# Patient Record
Sex: MALE | Race: BLACK OR AFRICAN AMERICAN | URBAN - METROPOLITAN AREA
[De-identification: names, ages, dates, MRNs, and addresses within clinical notes are randomized per-mention and may not be internally consistent; named-entity substitution may affect disease eponyms.]

---

## 2020-07-01 ENCOUNTER — HOSPITAL ENCOUNTER (INPATIENT)
Facility: MEDICAL CENTER | Age: 60
LOS: 2 days | DRG: 101 | End: 2020-07-04
Attending: EMERGENCY MEDICINE | Admitting: INTERNAL MEDICINE
Payer: COMMERCIAL

## 2020-07-01 ENCOUNTER — APPOINTMENT (OUTPATIENT)
Dept: RADIOLOGY | Facility: MEDICAL CENTER | Age: 60
DRG: 101 | End: 2020-07-01
Attending: EMERGENCY MEDICINE
Payer: COMMERCIAL

## 2020-07-01 DIAGNOSIS — R56.9 SEIZURE (HCC): Primary | ICD-10-CM

## 2020-07-01 DIAGNOSIS — R56.9 WITNESSED SEIZURE (HCC): ICD-10-CM

## 2020-07-01 DIAGNOSIS — G93.40 ACUTE ENCEPHALOPATHY: ICD-10-CM

## 2020-07-01 DIAGNOSIS — R41.82 ALTERED MENTAL STATUS, UNSPECIFIED ALTERED MENTAL STATUS TYPE: ICD-10-CM

## 2020-07-01 DIAGNOSIS — R56.9 SEIZURE (HCC): ICD-10-CM

## 2020-07-01 LAB
ALBUMIN SERPL BCP-MCNC: 4.1 G/DL (ref 3.2–4.9)
ALBUMIN/GLOB SERPL: 1.5 G/DL
ALP SERPL-CCNC: 48 U/L (ref 30–99)
ALT SERPL-CCNC: 12 U/L (ref 2–50)
AMPHET UR QL SCN: NEGATIVE
ANION GAP SERPL CALC-SCNC: 14 MMOL/L (ref 7–16)
APPEARANCE UR: CLEAR
AST SERPL-CCNC: 12 U/L (ref 12–45)
BARBITURATES UR QL SCN: NEGATIVE
BASOPHILS # BLD AUTO: 0.2 % (ref 0–1.8)
BASOPHILS # BLD: 0.01 K/UL (ref 0–0.12)
BENZODIAZ UR QL SCN: NEGATIVE
BILIRUB SERPL-MCNC: 0.3 MG/DL (ref 0.1–1.5)
BILIRUB UR QL STRIP.AUTO: NEGATIVE
BUN SERPL-MCNC: 10 MG/DL (ref 8–22)
BZE UR QL SCN: NEGATIVE
CALCIUM SERPL-MCNC: 9.5 MG/DL (ref 8.5–10.5)
CANNABINOIDS UR QL SCN: NEGATIVE
CHLORIDE SERPL-SCNC: 101 MMOL/L (ref 96–112)
CO2 SERPL-SCNC: 25 MMOL/L (ref 20–33)
COLOR UR: YELLOW
CREAT SERPL-MCNC: 1.04 MG/DL (ref 0.5–1.4)
EOSINOPHIL # BLD AUTO: 0.04 K/UL (ref 0–0.51)
EOSINOPHIL NFR BLD: 1 % (ref 0–6.9)
ERYTHROCYTE [DISTWIDTH] IN BLOOD BY AUTOMATED COUNT: 46.5 FL (ref 35.9–50)
ETHANOL BLD-MCNC: <10.1 MG/DL (ref 0–10.1)
GLOBULIN SER CALC-MCNC: 2.7 G/DL (ref 1.9–3.5)
GLUCOSE SERPL-MCNC: 90 MG/DL (ref 65–99)
GLUCOSE UR STRIP.AUTO-MCNC: NEGATIVE MG/DL
HCT VFR BLD AUTO: 45.9 % (ref 42–52)
HGB BLD-MCNC: 14.7 G/DL (ref 14–18)
IMM GRANULOCYTES # BLD AUTO: 0.01 K/UL (ref 0–0.11)
IMM GRANULOCYTES NFR BLD AUTO: 0.2 % (ref 0–0.9)
KETONES UR STRIP.AUTO-MCNC: NEGATIVE MG/DL
LEUKOCYTE ESTERASE UR QL STRIP.AUTO: NEGATIVE
LYMPHOCYTES # BLD AUTO: 0.98 K/UL (ref 1–4.8)
LYMPHOCYTES NFR BLD: 24.3 % (ref 22–41)
MCH RBC QN AUTO: 29.5 PG (ref 27–33)
MCHC RBC AUTO-ENTMCNC: 32 G/DL (ref 33.7–35.3)
MCV RBC AUTO: 92 FL (ref 81.4–97.8)
METHADONE UR QL SCN: NEGATIVE
MICRO URNS: NORMAL
MONOCYTES # BLD AUTO: 0.39 K/UL (ref 0–0.85)
MONOCYTES NFR BLD AUTO: 9.7 % (ref 0–13.4)
NEUTROPHILS # BLD AUTO: 2.61 K/UL (ref 1.82–7.42)
NEUTROPHILS NFR BLD: 64.6 % (ref 44–72)
NITRITE UR QL STRIP.AUTO: NEGATIVE
NRBC # BLD AUTO: 0 K/UL
NRBC BLD-RTO: 0 /100 WBC
OPIATES UR QL SCN: NEGATIVE
OXYCODONE UR QL SCN: NEGATIVE
PCP UR QL SCN: NEGATIVE
PH UR STRIP.AUTO: 7 [PH] (ref 5–8)
PLATELET # BLD AUTO: 179 K/UL (ref 164–446)
PMV BLD AUTO: 10.8 FL (ref 9–12.9)
POTASSIUM SERPL-SCNC: 3.4 MMOL/L (ref 3.6–5.5)
PROPOXYPH UR QL SCN: NEGATIVE
PROT SERPL-MCNC: 6.8 G/DL (ref 6–8.2)
PROT UR QL STRIP: NEGATIVE MG/DL
RBC # BLD AUTO: 4.99 M/UL (ref 4.7–6.1)
RBC UR QL AUTO: NEGATIVE
SODIUM SERPL-SCNC: 140 MMOL/L (ref 135–145)
SP GR UR STRIP.AUTO: 1.01
TROPONIN T SERPL-MCNC: 18 NG/L (ref 6–19)
UROBILINOGEN UR STRIP.AUTO-MCNC: 0.2 MG/DL
VALPROATE SERPL-MCNC: 93.7 UG/ML (ref 50–100)
WBC # BLD AUTO: 4 K/UL (ref 4.8–10.8)

## 2020-07-01 PROCEDURE — 80053 COMPREHEN METABOLIC PANEL: CPT

## 2020-07-01 PROCEDURE — 99205 OFFICE O/P NEW HI 60 MIN: CPT | Performed by: PSYCHIATRY & NEUROLOGY

## 2020-07-01 PROCEDURE — 96375 TX/PRO/DX INJ NEW DRUG ADDON: CPT

## 2020-07-01 PROCEDURE — 70450 CT HEAD/BRAIN W/O DYE: CPT

## 2020-07-01 PROCEDURE — 700111 HCHG RX REV CODE 636 W/ 250 OVERRIDE (IP)

## 2020-07-01 PROCEDURE — C9803 HOPD COVID-19 SPEC COLLECT: HCPCS | Performed by: PSYCHIATRY & NEUROLOGY

## 2020-07-01 PROCEDURE — 80307 DRUG TEST PRSMV CHEM ANLYZR: CPT

## 2020-07-01 PROCEDURE — 80164 ASSAY DIPROPYLACETIC ACD TOT: CPT

## 2020-07-01 PROCEDURE — 700111 HCHG RX REV CODE 636 W/ 250 OVERRIDE (IP): Performed by: EMERGENCY MEDICINE

## 2020-07-01 PROCEDURE — 81003 URINALYSIS AUTO W/O SCOPE: CPT

## 2020-07-01 PROCEDURE — G0378 HOSPITAL OBSERVATION PER HR: HCPCS

## 2020-07-01 PROCEDURE — 96365 THER/PROPH/DIAG IV INF INIT: CPT

## 2020-07-01 PROCEDURE — 700105 HCHG RX REV CODE 258: Performed by: STUDENT IN AN ORGANIZED HEALTH CARE EDUCATION/TRAINING PROGRAM

## 2020-07-01 PROCEDURE — 85025 COMPLETE CBC W/AUTO DIFF WBC: CPT

## 2020-07-01 PROCEDURE — 700105 HCHG RX REV CODE 258: Performed by: EMERGENCY MEDICINE

## 2020-07-01 PROCEDURE — 84484 ASSAY OF TROPONIN QUANT: CPT

## 2020-07-01 PROCEDURE — 93005 ELECTROCARDIOGRAM TRACING: CPT | Performed by: STUDENT IN AN ORGANIZED HEALTH CARE EDUCATION/TRAINING PROGRAM

## 2020-07-01 PROCEDURE — 99285 EMERGENCY DEPT VISIT HI MDM: CPT

## 2020-07-01 PROCEDURE — 93010 ELECTROCARDIOGRAM REPORT: CPT | Performed by: INTERNAL MEDICINE

## 2020-07-01 RX ORDER — DIVALPROEX SODIUM 500 MG/1
500 TABLET, DELAYED RELEASE ORAL EVERY 12 HOURS
Status: DISCONTINUED | OUTPATIENT
Start: 2020-07-01 | End: 2020-07-02

## 2020-07-01 RX ORDER — LORAZEPAM 2 MG/ML
1 INJECTION INTRAMUSCULAR EVERY 4 HOURS PRN
Status: DISCONTINUED | OUTPATIENT
Start: 2020-07-01 | End: 2020-07-02

## 2020-07-01 RX ORDER — SODIUM CHLORIDE 9 MG/ML
INJECTION, SOLUTION INTRAVENOUS CONTINUOUS
Status: DISCONTINUED | OUTPATIENT
Start: 2020-07-01 | End: 2020-07-04

## 2020-07-01 RX ORDER — POLYETHYLENE GLYCOL 3350 17 G/17G
1 POWDER, FOR SOLUTION ORAL
Status: DISCONTINUED | OUTPATIENT
Start: 2020-07-01 | End: 2020-07-04 | Stop reason: HOSPADM

## 2020-07-01 RX ORDER — LORAZEPAM 2 MG/ML
INJECTION INTRAMUSCULAR
Status: COMPLETED
Start: 2020-07-01 | End: 2020-07-01

## 2020-07-01 RX ORDER — LORAZEPAM 2 MG/ML
1 INJECTION INTRAMUSCULAR ONCE
Status: COMPLETED | OUTPATIENT
Start: 2020-07-01 | End: 2020-07-01

## 2020-07-01 RX ORDER — DIVALPROEX SODIUM 250 MG/1
250 TABLET, DELAYED RELEASE ORAL EVERY 8 HOURS
Status: DISCONTINUED | OUTPATIENT
Start: 2020-07-02 | End: 2020-07-01

## 2020-07-01 RX ORDER — AMOXICILLIN 250 MG
2 CAPSULE ORAL 2 TIMES DAILY
Status: DISCONTINUED | OUTPATIENT
Start: 2020-07-01 | End: 2020-07-04 | Stop reason: HOSPADM

## 2020-07-01 RX ORDER — BISACODYL 10 MG
10 SUPPOSITORY, RECTAL RECTAL
Status: DISCONTINUED | OUTPATIENT
Start: 2020-07-01 | End: 2020-07-04 | Stop reason: HOSPADM

## 2020-07-01 RX ADMIN — LORAZEPAM 1 MG: 2 INJECTION INTRAMUSCULAR at 17:12

## 2020-07-01 RX ADMIN — SODIUM CHLORIDE: 9 INJECTION, SOLUTION INTRAVENOUS at 23:02

## 2020-07-01 RX ADMIN — VALPROATE SODIUM 500 MG: 100 INJECTION, SOLUTION INTRAVENOUS at 17:38

## 2020-07-01 RX ADMIN — LORAZEPAM 1 MG: 2 INJECTION INTRAMUSCULAR; INTRAVENOUS at 17:12

## 2020-07-01 ASSESSMENT — ENCOUNTER SYMPTOMS
WEAKNESS: 1
FALLS: 0
DIZZINESS: 1
HEADACHES: 0
SHORTNESS OF BREATH: 0
COUGH: 0
NAUSEA: 0
ABDOMINAL PAIN: 0
VOMITING: 0
PALPITATIONS: 0
BACK PAIN: 0
SEIZURES: 1
TINGLING: 0
NECK PAIN: 0
HEMOPTYSIS: 0

## 2020-07-01 ASSESSMENT — FIBROSIS 4 INDEX: FIB4 SCORE: 1.18

## 2020-07-02 ENCOUNTER — APPOINTMENT (OUTPATIENT)
Dept: RADIOLOGY | Facility: MEDICAL CENTER | Age: 60
DRG: 101 | End: 2020-07-02
Attending: PSYCHIATRY & NEUROLOGY
Payer: COMMERCIAL

## 2020-07-02 ENCOUNTER — APPOINTMENT (OUTPATIENT)
Dept: RADIOLOGY | Facility: MEDICAL CENTER | Age: 60
DRG: 101 | End: 2020-07-02
Attending: STUDENT IN AN ORGANIZED HEALTH CARE EDUCATION/TRAINING PROGRAM
Payer: COMMERCIAL

## 2020-07-02 PROBLEM — R07.9 CHEST PAIN: Status: ACTIVE | Noted: 2020-07-02

## 2020-07-02 PROBLEM — R56.9 WITNESSED SEIZURE (HCC): Status: ACTIVE | Noted: 2020-07-02
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
## 2020-07-02 PROBLEM — G93.40 ACUTE ENCEPHALOPATHY: Status: ACTIVE | Noted: 2020-07-02

## 2020-07-02 PROBLEM — R35.0 URINARY FREQUENCY: Status: ACTIVE | Noted: 2020-07-02

## 2020-07-02 LAB
ALBUMIN SERPL BCP-MCNC: 3.8 G/DL (ref 3.2–4.9)
ALBUMIN/GLOB SERPL: 1.6 G/DL
ALP SERPL-CCNC: 46 U/L (ref 30–99)
ALT SERPL-CCNC: 16 U/L (ref 2–50)
AMMONIA PLAS-SCNC: 76 UMOL/L (ref 11–45)
ANION GAP SERPL CALC-SCNC: 12 MMOL/L (ref 7–16)
AST SERPL-CCNC: 15 U/L (ref 12–45)
BASOPHILS # BLD AUTO: 0.4 % (ref 0–1.8)
BASOPHILS # BLD: 0.02 K/UL (ref 0–0.12)
BILIRUB SERPL-MCNC: 0.6 MG/DL (ref 0.1–1.5)
BUN SERPL-MCNC: 9 MG/DL (ref 8–22)
CALCIUM SERPL-MCNC: 9.1 MG/DL (ref 8.5–10.5)
CHLORIDE SERPL-SCNC: 103 MMOL/L (ref 96–112)
CHOLEST SERPL-MCNC: 159 MG/DL (ref 100–199)
CO2 SERPL-SCNC: 27 MMOL/L (ref 20–33)
COVID ORDER STATUS COVID19: NORMAL
CREAT SERPL-MCNC: 1.04 MG/DL (ref 0.5–1.4)
EKG IMPRESSION: NORMAL
EOSINOPHIL # BLD AUTO: 0.04 K/UL (ref 0–0.51)
EOSINOPHIL NFR BLD: 0.8 % (ref 0–6.9)
ERYTHROCYTE [DISTWIDTH] IN BLOOD BY AUTOMATED COUNT: 45.3 FL (ref 35.9–50)
EST. AVERAGE GLUCOSE BLD GHB EST-MCNC: 111 MG/DL
GLOBULIN SER CALC-MCNC: 2.4 G/DL (ref 1.9–3.5)
GLUCOSE SERPL-MCNC: 82 MG/DL (ref 65–99)
HBA1C MFR BLD: 5.5 % (ref 0–5.6)
HCT VFR BLD AUTO: 43 % (ref 42–52)
HDLC SERPL-MCNC: 53 MG/DL
HGB BLD-MCNC: 13.7 G/DL (ref 14–18)
IMM GRANULOCYTES # BLD AUTO: 0.02 K/UL (ref 0–0.11)
IMM GRANULOCYTES NFR BLD AUTO: 0.4 % (ref 0–0.9)
LDLC SERPL CALC-MCNC: 93 MG/DL
LYMPHOCYTES # BLD AUTO: 1.44 K/UL (ref 1–4.8)
LYMPHOCYTES NFR BLD: 29.9 % (ref 22–41)
MAGNESIUM SERPL-MCNC: 2 MG/DL (ref 1.5–2.5)
MCH RBC QN AUTO: 28.9 PG (ref 27–33)
MCHC RBC AUTO-ENTMCNC: 31.9 G/DL (ref 33.7–35.3)
MCV RBC AUTO: 90.7 FL (ref 81.4–97.8)
MONOCYTES # BLD AUTO: 0.41 K/UL (ref 0–0.85)
MONOCYTES NFR BLD AUTO: 8.5 % (ref 0–13.4)
NEUTROPHILS # BLD AUTO: 2.88 K/UL (ref 1.82–7.42)
NEUTROPHILS NFR BLD: 60 % (ref 44–72)
NRBC # BLD AUTO: 0 K/UL
NRBC BLD-RTO: 0 /100 WBC
PLATELET # BLD AUTO: 173 K/UL (ref 164–446)
PMV BLD AUTO: 10.5 FL (ref 9–12.9)
POTASSIUM SERPL-SCNC: 3.7 MMOL/L (ref 3.6–5.5)
PROT SERPL-MCNC: 6.2 G/DL (ref 6–8.2)
RBC # BLD AUTO: 4.74 M/UL (ref 4.7–6.1)
SARS-COV-2 RDRP RESP QL NAA+PROBE: NOTDETECTED
SODIUM SERPL-SCNC: 142 MMOL/L (ref 135–145)
SPECIMEN SOURCE: NORMAL
TREPONEMA PALLIDUM IGG+IGM AB [PRESENCE] IN SERUM OR PLASMA BY IMMUNOASSAY: NORMAL
TRIGL SERPL-MCNC: 64 MG/DL (ref 0–149)
TROPONIN T SERPL-MCNC: 22 NG/L (ref 6–19)
TSH SERPL DL<=0.005 MIU/L-ACNC: 0.71 UIU/ML (ref 0.38–5.33)
VIT B12 SERPL-MCNC: 958 PG/ML (ref 211–911)
WBC # BLD AUTO: 4.8 K/UL (ref 4.8–10.8)

## 2020-07-02 PROCEDURE — 700102 HCHG RX REV CODE 250 W/ 637 OVERRIDE(OP): Performed by: STUDENT IN AN ORGANIZED HEALTH CARE EDUCATION/TRAINING PROGRAM

## 2020-07-02 PROCEDURE — 96367 TX/PROPH/DG ADDL SEQ IV INF: CPT

## 2020-07-02 PROCEDURE — 82140 ASSAY OF AMMONIA: CPT

## 2020-07-02 PROCEDURE — 80053 COMPREHEN METABOLIC PANEL: CPT

## 2020-07-02 PROCEDURE — 96366 THER/PROPH/DIAG IV INF ADDON: CPT

## 2020-07-02 PROCEDURE — U0004 COV-19 TEST NON-CDC HGH THRU: HCPCS

## 2020-07-02 PROCEDURE — 70553 MRI BRAIN STEM W/O & W/DYE: CPT

## 2020-07-02 PROCEDURE — 700111 HCHG RX REV CODE 636 W/ 250 OVERRIDE (IP): Performed by: STUDENT IN AN ORGANIZED HEALTH CARE EDUCATION/TRAINING PROGRAM

## 2020-07-02 PROCEDURE — 80061 LIPID PANEL: CPT

## 2020-07-02 PROCEDURE — 700117 HCHG RX CONTRAST REV CODE 255: Performed by: PSYCHIATRY & NEUROLOGY

## 2020-07-02 PROCEDURE — 86780 TREPONEMA PALLIDUM: CPT

## 2020-07-02 PROCEDURE — A9576 INJ PROHANCE MULTIPACK: HCPCS | Performed by: PSYCHIATRY & NEUROLOGY

## 2020-07-02 PROCEDURE — 83735 ASSAY OF MAGNESIUM: CPT

## 2020-07-02 PROCEDURE — A9270 NON-COVERED ITEM OR SERVICE: HCPCS | Performed by: STUDENT IN AN ORGANIZED HEALTH CARE EDUCATION/TRAINING PROGRAM

## 2020-07-02 PROCEDURE — 700102 HCHG RX REV CODE 250 W/ 637 OVERRIDE(OP): Performed by: PSYCHIATRY & NEUROLOGY

## 2020-07-02 PROCEDURE — 97166 OT EVAL MOD COMPLEX 45 MIN: CPT

## 2020-07-02 PROCEDURE — 71045 X-RAY EXAM CHEST 1 VIEW: CPT

## 2020-07-02 PROCEDURE — 95819 EEG AWAKE AND ASLEEP: CPT | Mod: 26 | Performed by: PSYCHIATRY & NEUROLOGY

## 2020-07-02 PROCEDURE — 770020 HCHG ROOM/CARE - TELE (206)

## 2020-07-02 PROCEDURE — 700105 HCHG RX REV CODE 258: Performed by: STUDENT IN AN ORGANIZED HEALTH CARE EDUCATION/TRAINING PROGRAM

## 2020-07-02 PROCEDURE — 84484 ASSAY OF TROPONIN QUANT: CPT

## 2020-07-02 PROCEDURE — 84443 ASSAY THYROID STIM HORMONE: CPT

## 2020-07-02 PROCEDURE — 92610 EVALUATE SWALLOWING FUNCTION: CPT

## 2020-07-02 PROCEDURE — 95819 EEG AWAKE AND ASLEEP: CPT | Performed by: PSYCHIATRY & NEUROLOGY

## 2020-07-02 PROCEDURE — 96376 TX/PRO/DX INJ SAME DRUG ADON: CPT

## 2020-07-02 PROCEDURE — 83036 HEMOGLOBIN GLYCOSYLATED A1C: CPT

## 2020-07-02 PROCEDURE — 700105 HCHG RX REV CODE 258: Performed by: PSYCHIATRY & NEUROLOGY

## 2020-07-02 PROCEDURE — 700111 HCHG RX REV CODE 636 W/ 250 OVERRIDE (IP): Performed by: PSYCHIATRY & NEUROLOGY

## 2020-07-02 PROCEDURE — 84425 ASSAY OF VITAMIN B-1: CPT

## 2020-07-02 PROCEDURE — 99233 SBSQ HOSP IP/OBS HIGH 50: CPT | Mod: GC,25 | Performed by: PSYCHIATRY & NEUROLOGY

## 2020-07-02 PROCEDURE — C9254 INJECTION, LACOSAMIDE: HCPCS | Performed by: PSYCHIATRY & NEUROLOGY

## 2020-07-02 PROCEDURE — 96372 THER/PROPH/DIAG INJ SC/IM: CPT

## 2020-07-02 PROCEDURE — 4A10X4Z MONITORING OF CENTRAL NERVOUS ELECTRICAL ACTIVITY, EXTERNAL APPROACH: ICD-10-PCS | Performed by: PSYCHIATRY & NEUROLOGY

## 2020-07-02 PROCEDURE — 82607 VITAMIN B-12: CPT

## 2020-07-02 PROCEDURE — 85025 COMPLETE CBC W/AUTO DIFF WBC: CPT

## 2020-07-02 PROCEDURE — A9270 NON-COVERED ITEM OR SERVICE: HCPCS | Performed by: PSYCHIATRY & NEUROLOGY

## 2020-07-02 PROCEDURE — 36415 COLL VENOUS BLD VENIPUNCTURE: CPT

## 2020-07-02 PROCEDURE — 99221 1ST HOSP IP/OBS SF/LOW 40: CPT | Mod: GC | Performed by: INTERNAL MEDICINE

## 2020-07-02 PROCEDURE — 700101 HCHG RX REV CODE 250: Performed by: STUDENT IN AN ORGANIZED HEALTH CARE EDUCATION/TRAINING PROGRAM

## 2020-07-02 PROCEDURE — 97161 PT EVAL LOW COMPLEX 20 MIN: CPT

## 2020-07-02 RX ORDER — LORAZEPAM 2 MG/ML
1-2 INJECTION INTRAMUSCULAR EVERY 4 HOURS PRN
Status: DISCONTINUED | OUTPATIENT
Start: 2020-07-02 | End: 2020-07-04 | Stop reason: HOSPADM

## 2020-07-02 RX ORDER — LIDOCAINE AND PRILOCAINE 25; 25 MG/G; MG/G
CREAM TOPICAL PRN
Status: DISCONTINUED | OUTPATIENT
Start: 2020-07-02 | End: 2020-07-04 | Stop reason: HOSPADM

## 2020-07-02 RX ORDER — HYDROXYZINE HYDROCHLORIDE 25 MG/1
50 TABLET, FILM COATED ORAL
Status: DISCONTINUED | OUTPATIENT
Start: 2020-07-02 | End: 2020-07-02

## 2020-07-02 RX ORDER — LORAZEPAM 2 MG/ML
1 INJECTION INTRAMUSCULAR ONCE
Status: DISPENSED | OUTPATIENT
Start: 2020-07-02 | End: 2020-07-03

## 2020-07-02 RX ORDER — POTASSIUM CHLORIDE 20 MEQ/1
40 TABLET, EXTENDED RELEASE ORAL ONCE
Status: COMPLETED | OUTPATIENT
Start: 2020-07-02 | End: 2020-07-02

## 2020-07-02 RX ORDER — LORAZEPAM 2 MG/ML
1 INJECTION INTRAMUSCULAR ONCE
Status: COMPLETED | OUTPATIENT
Start: 2020-07-02 | End: 2020-07-02

## 2020-07-02 RX ADMIN — DIVALPROEX SODIUM 500 MG: 500 TABLET, DELAYED RELEASE ORAL at 00:58

## 2020-07-02 RX ADMIN — LORAZEPAM 1 MG: 2 INJECTION INTRAMUSCULAR; INTRAVENOUS at 18:51

## 2020-07-02 RX ADMIN — POTASSIUM CHLORIDE 40 MEQ: 1500 TABLET, EXTENDED RELEASE ORAL at 05:20

## 2020-07-02 RX ADMIN — SODIUM CHLORIDE 100 MG: 9 INJECTION, SOLUTION INTRAVENOUS at 00:58

## 2020-07-02 RX ADMIN — VALPROATE SODIUM 500 MG: 100 INJECTION, SOLUTION INTRAVENOUS at 13:58

## 2020-07-02 RX ADMIN — LIDOCAINE AND PRILOCAINE 1 APPLICATION: 25; 25 CREAM TOPICAL at 22:32

## 2020-07-02 RX ADMIN — DOCUSATE SODIUM 50 MG AND SENNOSIDES 8.6 MG 2 TABLET: 8.6; 5 TABLET, FILM COATED ORAL at 05:20

## 2020-07-02 RX ADMIN — ENOXAPARIN SODIUM 40 MG: 40 INJECTION, SOLUTION INTRAVENOUS; SUBCUTANEOUS at 05:20

## 2020-07-02 RX ADMIN — GADOTERIDOL 20 ML: 279.3 INJECTION, SOLUTION INTRAVENOUS at 19:10

## 2020-07-02 RX ADMIN — SODIUM CHLORIDE 100 MG: 9 INJECTION, SOLUTION INTRAVENOUS at 12:42

## 2020-07-02 ASSESSMENT — ENCOUNTER SYMPTOMS
BLURRED VISION: 0
CHILLS: 0
DIZZINESS: 1
MEMORY LOSS: 1
HEADACHES: 1
ABDOMINAL PAIN: 0
DEPRESSION: 0
FEVER: 0
FALLS: 1
NERVOUS/ANXIOUS: 1
COUGH: 1
DOUBLE VISION: 0

## 2020-07-02 ASSESSMENT — COGNITIVE AND FUNCTIONAL STATUS - GENERAL
PERSONAL GROOMING: A LITTLE
EATING MEALS: A LITTLE
DRESSING REGULAR UPPER BODY CLOTHING: A LITTLE
SUGGESTED CMS G CODE MODIFIER DAILY ACTIVITY: CK
DRESSING REGULAR UPPER BODY CLOTHING: A LITTLE
MOVING FROM LYING ON BACK TO SITTING ON SIDE OF FLAT BED: A LOT
WALKING IN HOSPITAL ROOM: A LOT
SUGGESTED CMS G CODE MODIFIER DAILY ACTIVITY: CK
MOBILITY SCORE: 24
DAILY ACTIVITIY SCORE: 15
EATING MEALS: A LITTLE
PERSONAL GROOMING: A LITTLE
MOVING TO AND FROM BED TO CHAIR: A LOT
HELP NEEDED FOR BATHING: A LOT
TOILETING: A LITTLE
TURNING FROM BACK TO SIDE WHILE IN FLAT BAD: A LOT
MOBILITY SCORE: 11
CLIMB 3 TO 5 STEPS WITH RAILING: TOTAL
SUGGESTED CMS G CODE MODIFIER MOBILITY: CL
HELP NEEDED FOR BATHING: A LITTLE
SUGGESTED CMS G CODE MODIFIER MOBILITY: CH
TOILETING: A LOT
DRESSING REGULAR LOWER BODY CLOTHING: A LOT
DAILY ACTIVITIY SCORE: 18
DRESSING REGULAR LOWER BODY CLOTHING: A LITTLE
STANDING UP FROM CHAIR USING ARMS: A LOT

## 2020-07-02 ASSESSMENT — LIFESTYLE VARIABLES
HOW MANY TIMES IN THE PAST YEAR HAVE YOU HAD 5 OR MORE DRINKS IN A DAY: 0
EVER FELT BAD OR GUILTY ABOUT YOUR DRINKING: NO
ALCOHOL_USE: NO
TOTAL SCORE: 0
ON A TYPICAL DAY WHEN YOU DRINK ALCOHOL HOW MANY DRINKS DO YOU HAVE: 0
HAVE YOU EVER FELT YOU SHOULD CUT DOWN ON YOUR DRINKING: NO
CONSUMPTION TOTAL: NEGATIVE
EVER HAD A DRINK FIRST THING IN THE MORNING TO STEADY YOUR NERVES TO GET RID OF A HANGOVER: NO
TOTAL SCORE: 0
HAVE PEOPLE ANNOYED YOU BY CRITICIZING YOUR DRINKING: NO
AVERAGE NUMBER OF DAYS PER WEEK YOU HAVE A DRINK CONTAINING ALCOHOL: 0
EVER_SMOKED: NEVER
TOTAL SCORE: 0

## 2020-07-02 ASSESSMENT — FIBROSIS 4 INDEX: FIB4 SCORE: 1.18

## 2020-07-02 ASSESSMENT — PATIENT HEALTH QUESTIONNAIRE - PHQ9
SUM OF ALL RESPONSES TO PHQ9 QUESTIONS 1 AND 2: 0
2. FEELING DOWN, DEPRESSED, IRRITABLE, OR HOPELESS: NOT AT ALL
1. LITTLE INTEREST OR PLEASURE IN DOING THINGS: NOT AT ALL

## 2020-07-02 ASSESSMENT — ACTIVITIES OF DAILY LIVING (ADL): TOILETING: INDEPENDENT

## 2020-07-02 ASSESSMENT — GAIT ASSESSMENTS: GAIT LEVEL OF ASSIST: UNABLE TO PARTICIPATE

## 2020-07-02 NOTE — THERAPY
Speech Language Pathology   Clinical Swallow Evaluation     Patient Name: Wallace Crow  AGE:  60 y.o., SEX:  male  Medical Record #: [id number]  Today's Date: 7/2/2020     Assessment    Patient is 60 y.o. male with a diagnosis of seizure with AMS. Patient was sleeping prior to session but woke with verbal cues and time. Patient was able to follow oral motor directives with no gross asymmetry or weakness appreciated. The patient was given PO trials of ice chips, thins, purees, and MTL. Patient presented with consistent throat clear and cough following >90% of PO trials. Patient was able to cough and clear vocal quality however, once additional PO trials were provided s/s concerning for penetration/aspiration persisted. Patient is presently exhibiting s/s that remain concerning for penetration/aspiration despite texture modifications and swallow strategies.     Plan    Recommendations: 1) Strict NPO with consideration of non-oral nutrition source vs reassessment by SLP in AM (7/3).     Recommend Speech Therapy 3 times per week until therapy goals are met for the following treatments:  Dysphagia Training.    Discharge recommendations:  Recommend inpatient transitional care services for continued speech therapy services.       Objective       07/02/20 0801   Oral Motor Eval    Is Patient Able to Complete Oral Motor Eval Yes, Within Normal Limits   Laryngeal Function   Voice Quality Within Functional Limits   Volutional Cough Within Functional Limits   Oral Food Presentation   Ice Chips Within Functional Limits   Single Swallow Mildly Thick (2) - (Nectar Thick)  Moderate   Serial Swallow Mildly Thick (2) - (Nectar Thick) Moderate   Single Swallow Thin (0) Moderate   Serial Swallow Thin (0) Moderate   Liquidised (3) Moderate   Pureed (4) Moderate   Dysphagia Strategies / Recommendations   Compensatory Strategies To Be Assessed   Diet / Liquid Recommendation NPO;Pre-Feeding Trials with SLP Only   Medication Administration   Other (See Comments)  (Non-oral route )   Therapy Interventions Dysphagia Therapy By Speech Language Pathologist;Pharyngeal / Laryngeal Exercises;Oral Motor Exercises   Dysphagia Rating   Nutritional Liquid Intake Rating Scale Nothing by mouth   Nutritional Food Intake Rating Scale Nothing by mouth   Short Term Goals   Short Term Goal # 1 The patient will consume prefeeding  trials with no overt s/s of aspiration given min A for swallow strategies

## 2020-07-02 NOTE — PROGRESS NOTES
I discussed with Dr. Pandya. Mr. Crow reportedly is a seizure disorder and recently changed from Vimpat to Depakote.  He was brought in for presumptive seizure-like activity.  He was postictal and is too somnolent to go home.  CT of the head and U tox are negative.   I discussed with Dr. Zelaya, [facility] senior resident for admission.

## 2020-07-02 NOTE — THERAPY
Occupational Therapy   Initial Evaluation     Patient Name: Wallace Crow  Age:  60 y.o., Sex:  male  Medical Record #: [id number]  Today's Date: 7/2/2020     Precautions  Precautions: Fall Risk  Comments: seizures    Assessment  Patient is 60 y.o. male with a diagnosis of seizure.  Additional factors influencing patient status / progress: weakness, fatigue, impaired balance, impaired cognition, impaired vision.      Plan    Recommend Occupational Therapy 3 times per week until therapy goals are met for the following treatments:  Adaptive Equipment, Cognitive Skill Development, Neuro Re-Education / Balance, Self Care/Activities of Daily Living, Therapeutic Activities and Therapeutic Exercises.    Discharge recommendations:  Recommend post-acute placement for additional occupational therapy services prior to discharge home.       07/02/20 0916   Prior Living Situation   Prior Services Intermittent Physical Support for ADL Per Family   Housing / Facility 1 Story House   Bathroom Set up Walk In Shower;Grab Bars   Equipment Owned Grab Bar(s) In Tub / Shower   Lives with - Patient's Self Care Capacity Adult Children   Comments Pt lives with his Dtr and her boyfriend. They help intermittently with ADLs and with all IADLs. Questionable historian?   Prior Level of ADL Function   Self Feeding Independent   Grooming / Hygiene Independent   Bathing Independent   Dressing Independent   Toileting Independent   Prior Level of IADL Function   Prior Level Of Mobility Independent Without Device in Community;Independent Without Device in Home   Cognition    Cognition / Consciousness X   Speech/ Communication Delayed Responses   Level of Consciousness Alert   Ability To Follow Commands 1 Step   Safety Awareness Impaired   New Learning Impaired   Comments pleasant and cooperative, odd affect   Active ROM Upper Body   Active ROM Upper Body  X   Dominant Hand Left   Comments LUE limited at the shoulder due to pain.   Bed Mobility    Supine  to Sit Maximal Assist   Sit to Supine Maximal Assist   Scooting Maximal Assist   ADL Assessment   Grooming Minimal Assist;Seated   Lower Body Dressing Maximal Assist  (socks)   Toileting   (condom cath)   Functional Mobility   Sit to Stand Moderate Assist   Bed, Chair, Wheelchair Transfer Unable to Participate   Mobility EOB>STS>Side steps>Btb   Comments w/ fww   Visual Perception   Visual Perception  X   Comments difficult time scanning to the right and left, needs max prompting. still not able to scan to the right - almost fixed midline. difficult to truly assess due to impaired cognition   Short Term Goals   Short Term Goal # 1 pt will demo toilet txf with Ulises   Short Term Goal # 2 pt will dress LB with Ulises   Short Term Goal # 3 pt will groom in stance with supv   Short Term Goal # 4 pt will identify 3/3 objects to L and R w/o prompting   Anticipated Discharge Equipment   DC Equipment Unable To Determine At This Time

## 2020-07-02 NOTE — ASSESSMENT & PLAN NOTE
Per nursing, patient has had frequent urination with light colored urine output. Patient denies having history of diabetes, but possible it is manifesting now vs he may have ingested something before his seizure activity, although tox screen was negative.  -Hgb A1C 5.5.   - Urinalysis is negative.

## 2020-07-02 NOTE — DISCHARGE PLANNING
"Anticipated Discharge Disposition:   [facility]    Action:    Pt admitted with acute encephalopathy, witnessed seizure.    RN CM spoke to patient.  He was wearing a posey vest.  He had difficulty articulating words.  Pt stated he lives in [facility]".  Provided his social security number.  He had his smart phone on bedside table.  He used his right arm/hand and would not move his left side.  He could not touch the screen to open up the phone roopa.  RN CM touched the phone roopa to locate his daughter's cell #.      RN CM telephoned patient's daughter.  She stated that she and her boyfriend live with patient in a single level house in [facility].  Pt does not use DME.  He does not drive.  He has epilepsy and has been disabled for 40 years.  They just arrived to [place] yesterday to visit family and patient had a seizure.  Pt is left handed and cannot see without his glasses.    SLP recommending inpatient transitional care.    Barriers to Discharge:    Medical clearance    Plan:    Review OT/PT evaluations.    Care Transition Team Assessment    Information Source  Orientation : Disoriented to Time  Information Given By: Patient, Relative  Who is responsible for making decisions for patient? : Patient    Readmission Evaluation  Is this a readmission?: No    Elopement Risk  Legal Hold: No  Ambulatory or Self Mobile in Wheelchair: Yes  Disoriented: No  Psychiatric Symptoms: Impulsivity-at Risk for Elopement  History of Wandering: No  Elopement this Admit: No  Vocalizing Wanting to Leave: No  Displays Behaviors, Body Language Wanting to Leave: No-Not at Risk for Elopement  Elopement Risk: Not at Risk for Elopement  Purple Armband on Patient: No (See Comments)    Interdisciplinary Discharge Planning  Lives with - Patient's Self Care Capacity: Unable To Determine At This Time  Patient or legal guardian wants to designate a caregiver (see row info): No  Prior Services: Unable To Determine At This Time    Discharge " Preparedness  What is your plan after discharge?: Uncertain - pending medical team collaboration  What are your discharge supports?: Child  Prior Functional Level: Ambulatory, Independent with Activities of Daily Living, Independent with Medication Management  Difficulity with ADLs: Bathing, Brushing teeth, Dressing, Eating, Toileting, Walking  Difficulity with IADLs: Cooking, Driving, Keeping track of finances, Laundry, Managing medication, Shopping, Using the telephone or computer    Functional Assesment  Prior Functional Level: Ambulatory, Independent with Activities of Daily Living, Independent with Medication Management    Finances  Financial Barriers to Discharge: No  Prescription Coverage: Yes              Advance Directive  Advance Directive?: None    Domestic Abuse  Have you ever been the victim of abuse or violence?: Not Sure         Discharge Risks or Barriers  Discharge risks or barriers?: No    Anticipated Discharge Information  Anticipated discharge disposition: Acute rehab  Discharge Contact Phone Number: [phone]

## 2020-07-02 NOTE — ED NOTES
Pt up, stumbling in hallway with unsteady gait.  Still does not answer questions. Assisted to toilet, sat on toilet for a few minutes but did not void or have a bowel movement.  Assisted back to bed.  Gait remains unsteady.  MD aware, to bedside.

## 2020-07-02 NOTE — PROGRESS NOTES
Daily Progress Note:     Date of Service: 7/2/2020  Primary Team: [facility] Team   Attending: Benjamin Isabel M.D.   Senior Resident: Dr. Roblero  Intern: Dr. Jose  Contact:  [phone]    Chief Complaint:   Seizure like activity and fall    ID: Mr crow is a 60 year old man who was admitted to the hospital for post-ictal state following a seizure which resulted in a fall 07/01/2020    subjective: Pt does not recall events leading to admission. This hx was taken from the pt's daughter, who lives with him, witnessed the event and contacted emergency services. Earlier to admission, Mr. Crow was seated on his sofa at home. Per daughter, he was found on the floor. She did not witnessed Seizure actvity per se, only heard the fall and entered the room. She did not see the usual morphology of his baseline seizures .    Consultants/Specialty:  Neurology    Review of Systems:    Review of Systems   Unable to perform ROS: Mental status change       Objective Data:   Physical Exam:     Vitals:   Temp:  [36.2 °C (97.2 °F)-36.7 °C (98.1 °F)] 36.4 °C (97.5 °F)  Pulse:  [68-97] 68  Resp:  [10-20] 18  BP: (126-173)/() 126/82  SpO2:  [92 %-97 %] 97 %     Physical Exam  HENT:      Head: Normocephalic and atraumatic.      Nose: Nose normal.   Eyes:      Extraocular Movements: Extraocular movements intact.      Pupils: Pupils are equal, round, and reactive to light.   Neck:      Musculoskeletal: Normal range of motion and neck supple.   Cardiovascular:      Rate and Rhythm: Normal rate and regular rhythm.      Pulses: Normal pulses.      Heart sounds: Normal heart sounds.   Pulmonary:      Effort: Pulmonary effort is normal. No respiratory distress.      Breath sounds: Normal breath sounds. Stridor present. No wheezing, rhonchi or rales.   Chest:      Chest wall: Tenderness (Chest wall tenderness over L paraspinal region over 4th chondrcostal joint) present.   Abdominal:      General: Abdomen is flat. Bowel sounds  are normal.      Palpations: Abdomen is soft.   Musculoskeletal:      Right lower leg: No edema.      Left lower leg: No edema.   Skin:     General: Skin is warm and dry.      Capillary Refill: Capillary refill takes less than 2 seconds.   Neurological:      Mental Status: He is alert. He is disoriented.      Comments: Oriented to self and place only. Unable to follow commands during examination. Repeated examination later in the day found the patient more directable and able to follow commands. Mid-examination the patient exhibit acute change in mentation and ability to follow commands.            Labs:   Results for DICK HARRINGTON (MRN [id number]) as of 7/2/2020 13:28   Ref. Range 7/1/2020 23:44 7/2/2020 05:04 7/2/2020 05:05 7/2/2020 09:50 7/2/2020 10:58   WBC Latest Ref Range: 4.8 - 10.8 K/uL   4.8     RBC Latest Ref Range: 4.70 - 6.10 M/uL   4.74     Hemoglobin Latest Ref Range: 14.0 - 18.0 g/dL   13.7 (L)     Hematocrit Latest Ref Range: 42.0 - 52.0 %   43.0     MCV Latest Ref Range: 81.4 - 97.8 fL   90.7     MCH Latest Ref Range: 27.0 - 33.0 pg   28.9     MCHC Latest Ref Range: 33.7 - 35.3 g/dL   31.9 (L)     RDW Latest Ref Range: 35.9 - 50.0 fL   45.3     Platelet Count Latest Ref Range: 164 - 446 K/uL   173     MPV Latest Ref Range: 9.0 - 12.9 fL   10.5     Neutrophils-Polys Latest Ref Range: 44.00 - 72.00 %   60.00     Neutrophils (Absolute) Latest Ref Range: 1.82 - 7.42 K/uL   2.88     Lymphocytes Latest Ref Range: 22.00 - 41.00 %   29.90     Lymphs (Absolute) Latest Ref Range: 1.00 - 4.80 K/uL   1.44     Monocytes Latest Ref Range: 0.00 - 13.40 %   8.50     Monos (Absolute) Latest Ref Range: 0.00 - 0.85 K/uL   0.41     Eosinophils Latest Ref Range: 0.00 - 6.90 %   0.80     Eos (Absolute) Latest Ref Range: 0.00 - 0.51 K/uL   0.04     Basophils Latest Ref Range: 0.00 - 1.80 %   0.40     Baso (Absolute) Latest Ref Range: 0.00 - 0.12 K/uL   0.02     Immature Granulocytes Latest Ref Range: 0.00 - 0.90 %   0.40      Immature Granulocytes (abs) Latest Ref Range: 0.00 - 0.11 K/uL   0.02     Nucleated RBC Latest Units: /100 WBC   0.00     NRBC (Absolute) Latest Units: K/uL   0.00     Sodium Latest Ref Range: 135 - 145 mmol/L  142      Potassium Latest Ref Range: 3.6 - 5.5 mmol/L  3.7      Chloride Latest Ref Range: 96 - 112 mmol/L  103      Co2 Latest Ref Range: 20 - 33 mmol/L  27      Anion Gap Latest Ref Range: 7.0 - 16.0   12.0      Glucose Latest Ref Range: 65 - 99 mg/dL  82      Bun Latest Ref Range: 8 - 22 mg/dL  9      Creatinine Latest Ref Range: 0.50 - 1.40 mg/dL  1.04      GFR If  Latest Ref Range: >60 mL/min/1.73 m 2  >60      GFR If Non  Latest Ref Range: >60 mL/min/1.73 m 2  >60      Calcium Latest Ref Range: 8.5 - 10.5 mg/dL  9.1      AST(SGOT) Latest Ref Range: 12 - 45 U/L  15      ALT(SGPT) Latest Ref Range: 2 - 50 U/L  16      Alkaline Phosphatase Latest Ref Range: 30 - 99 U/L  46      Total Bilirubin Latest Ref Range: 0.1 - 1.5 mg/dL  0.6      Albumin Latest Ref Range: 3.2 - 4.9 g/dL  3.8      Total Protein Latest Ref Range: 6.0 - 8.2 g/dL  6.2      Globulin Latest Ref Range: 1.9 - 3.5 g/dL  2.4      A-G Ratio Latest Units: g/dL  1.6      Magnesium Latest Ref Range: 1.5 - 2.5 mg/dL  2.0      Cholesterol,Tot Latest Ref Range: 100 - 199 mg/dL  159      Triglycerides Latest Ref Range: 0 - 149 mg/dL  64      HDL Latest Ref Range: >=40 mg/dL  53      LDL Latest Ref Range: <100 mg/dL  93      Troponin T Latest Ref Range: 6 - 19 ng/L  22 (H)      Vitamin B12 -True Cobalamin Latest Ref Range: 211 - 911 pg/mL  958 (H)      TSH Latest Ref Range: 0.380 - 5.330 uIU/mL  0.709      COVID Order Status Unknown     Received   SARS-CoV-2 Source Unknown     NP Swab     Imaging:   CXR (AP) 07/02: Atelectasis right perihilar region.  CT headw/o con 07/01: No acute intracranial abnormality identified.  EKG: NSR    Acute encephalopathy  Assessment & Plan  Patient presents to ED with witnessed seizure  activity different from baseline and has continued somnolence, most likely a post-ictal state Vs sub-clinical status epilepticus vs toxic encephalopathy. . Infectious process unlikely (no nuchal rigidity, no photophobia). Although negative Utox is not definitive, toxic encephalopathy is less likely considering no toxins were found in serum.     -keep NPO per speech therapy recc's.  -Q4h neuro check  -EEG  -continue depakote 500 BID  -vimpat IV per neuro  -Continue speech tx  -Continue PT/OT  - Neurology consult placed; recc's appreciated.    Witnessed seizure (HCC)  Assessment & Plan  Patient had witnessed seizure that was different from baseline seizures. States that he takes his seizure medications and doesn't have any other medical history for which he takes medications. Patient does not have any tongue lacerations.  - Admit to neuro  - continue depakote 500 IV over 12 hours Q12H. Plan transition to PO pending advanced diet per speech.  - Lacosomide 100 IV Q12H.  Plan transition to PO pending advanced diet per speech.  - q4h neuro check  - Neurology consult placed; recc's apreciated    Chest pain  Assessment & Plan   Patient complains of sternal chest pain that radiates to left shoulder but does not radiate to jaw or further into chest. Pain is reproducible on physical exam with palpation of L 4th costochondral joint. ECG unremarkable, troponin elevated to 22, more consistent with vigorous exertion than corinna MI. Initial Ddx for this patient considering his age was Costochondritis vs MI vs GERD. Reproducibility of pain with palpation, localization, nature of pain suggest costochondritis is most likely. Negative workup per above points away from ongoing cardiac injury.   - lidocaine cream for pain    Urinary frequency  Assessment & Plan  Per nursing, patient has had frequent urination with light colored urine output. Patient denies having history of diabetes, but possible it is manifesting now vs he may have  ingested something before his seizure activity, although tox screen was negative.  -Hgb A1C pending.   - Urinalysis is negative.    Diet: strict NPO  Pain: lidocaine cream PRN  Bowel regimen: per protocol  PT/OT: ongoing  DVT prophylaxis: ACds, ambulation  Code status: full.

## 2020-07-02 NOTE — ED PROVIDER NOTES
ED Provider Note    Scribed for Robe Pandya M.D. by Denzel Diaz. 7/1/2020  5:07 PM    Primary care provider: None noted  Means of arrival: EMS  History obtained from: Patient  History limited by: None    CHIEF COMPLAINT  Chief Complaint   Patient presents with   • ALOC       HPI  Wallace Crow is a 60 y.o. male who presents to the Emergency Department after being brought in by EMS. Per EMS the patient suffered a witnessed seizure earlier this morning which was said to be different than his normal seizures, and thus EMS was called and the patient was brought here. Upon arrival he was awake and alert, with tears streaming down his face. He does not answer questions but responds to commands and stimuli. Shortly after being roomed, he had an episode where he began to shake and became agitated, trying to get out of bed. This episode resolved, and afterwards the patient would not respond to stimuli or commands. Approximately five minutes afterwards the patient started to respond and became more agreeable, however still does not answer questions or speak. He took his last dose of Vimpat yesterday and his first dose of Depakote today. He did not have any incontinence or oral trauma secondary to his seizures.    REVIEW OF SYSTEMS  Pertinent negatives include no oral trauma, incontience. As above, all other systems reviewed and are negative.   See HPI for further details.     PAST MEDICAL HISTORY   has a past medical history of Seizure disorder (HCC).    SURGICAL HISTORY  patient denies any surgical history    SOCIAL HISTORY  Social History     Tobacco Use   • Smoking status: Not on file   Substance Use Topics   • Alcohol use: Not on file   • Drug use: Not on file      Social History     Substance and Sexual Activity   Drug Use Not on file       FAMILY HISTORY  History reviewed. No pertinent family history.    CURRENT MEDICATIONS  Home Medications     Reviewed by Hellen Razo R.N. (Registered Nurse) on 07/01/20 at 9359   "Med List Status: Not Addressed   Medication Last Dose Status   Divalproex Sodium (DEPAKOTE PO)  Active   Lacosamide (VIMPAT PO)  Active                ALLERGIES  Not on File    PHYSICAL EXAM  VITAL SIGNS: BP (!) 163/97   Pulse 87   Temp 36.6 °C (97.9 °F)   Resp 14   Ht 1.803 m (5' 11\")   Wt 99.8 kg (220 lb)   SpO2 93%   BMI 30.68 kg/m²   Constitutional: Well developed, Well nourished  HENT: Normocephalic, Atraumatic, Bilateral external ears normal, Oropharynx is clear mucous membranes are moist. No oral exudates or nasal discharge.   Eyes: Pupils are equal round and reactive, EOMI, Conjunctiva normal, No discharge.   Neck: Normal range of motion, No tenderness, Supple, No stridor. No meningismus.  Lymphatic: No lymphadenopathy noted.   Cardiovascular: Regular rate and rhythm without murmur rub or gallop.  Thorax & Lungs: Clear breath sounds bilaterally without wheezes, rhonchi or rales. There is no chest wall tenderness.   Abdomen: Soft non-tender non-distended. There is no rebound or guarding. No organomegaly is appreciated. Bowel sounds are normal.  Skin: Normal without rash.   Back: No CVA or spinal tenderness.   Extremities: Intact distal pulses, No edema, No tenderness, No cyanosis, No clubbing. Capillary refill is less than 2 seconds.  Musculoskeletal: Good range of motion in all major joints. No tenderness to palpation or major deformities noted.   Neurologic: Responds to verbal stimuli and commands, will not speak, Normal motor function, Normal sensory function, No focal deficits noted. Reflexes are normal.  Psychiatric: Does not respond to questions or speak      DIAGNOSTIC STUDIES / PROCEDURES    LABS  Labs Reviewed   CBC WITH DIFFERENTIAL - Abnormal; Notable for the following components:       Result Value    WBC 4.0 (*)     MCHC 32.0 (*)     Lymphs (Absolute) 0.98 (*)     All other components within normal limits   COMP METABOLIC PANEL - Abnormal; Notable for the following components:    Potassium " 3.4 (*)     All other components within normal limits   VALPROIC ACID   ESTIMATED GFR   URINE DRUG SCREEN   DIAGNOSTIC ALCOHOL      All labs reviewed by me.    COURSE & MEDICAL DECISION MAKING  Nursing notes, VS, PMSFHx reviewed in chart.    5:07 PM Patient seen and examined at bedside. Ordered for CBC with differential, CMP, Valproic acid, Estimated GFR to evaluate. Patient was treated with Ativan 1 mg, Depacon 500 mg in D5W 50 ml for his symptoms.     The patient presents with altered mental status of unclear etiology.  Toxidrome is a possibility.  Does not appear to have head trauma but given that he has had a number of episodes of somnolence and difficult arousability I decided to perform CT scan of the head which is unremarkable showing no evidence of intracranial bleed or excessive fluid.    Urine drug screen is negative and urinalysis is also negative and his valproic acid level is therapeutic and alcohol level is negligible.  Electrolytes are unremarkable    6:36 PM - Patient reassessed at this time. Informed by nursing staff that he has had two episodes of urine incontinence. He has difficulty answering questions but is able to do so and states that he has been compliant with his seizure medications.    6:41 PM - Ordered for CT-Head w/o as patients neurological status is not improving.    7:48 PM -paged hospitalist.  I spoke with Dr. Stewart who will coordinate hospitalization of this patient who has altered mental status of unclear etiology.  He is followed by neurology and this would be a reasonable consultation however he appears to be compliant with his medications.  He may still be a toxidrome which is negative on U tox    DISPOSITION:  Patient will be hospitalized by [facility] in guarded condition.    FINAL IMPRESSION  1. Seizure (HCC)    2. Altered mental status, unspecified altered mental status type          I, Denzel Diaz (Scribe), am scribing for, and in the presence of, Robe Pandya,  M.D..    Electronically signed by: Denzel Diaz (Scribe), 7/1/2020    IRobe M.D. personally performed the services described in this documentation, as scribed by Denzel Diaz in my presence, and it is both accurate and complete. C.    The note accurately reflects work and decisions made by me.  Robe Pandya M.D.  7/1/2020  9:50 PM

## 2020-07-02 NOTE — ED NOTES
Pt voided partially in urinal, partially in bed. found ambulating in room again. Linens changed, assisted back to bed.  MD aware.

## 2020-07-02 NOTE — PROGRESS NOTES
Per SLP, patient failed swallow eval. Coughing on applesauce, water and is unsafe to give meds at this time. Will keep patient NPO strict per protocol. Will notify MD.

## 2020-07-02 NOTE — H&P
History & Physical Note    Date of Admission: 7/1/2020  Admission Status: Emergency  [facility] Team: [facility]  Attending: Benjamin Isabel M.D.   Senior Resident: Dr. Roblero  Intern: Dr. Jose  Contact Number: [phone]    Chief Complaint: Witnessed seizure, altered mental status    History of Present Illness (HPI):  Wallace is a 60 y.o. male who presented 7/1/2020 with a witnessed seizure this AM that is reportedly different from baseline seizure. Upon arrival to ED he was awake and alert. Patient was somnolent, can answer some yes/no questions and able follow some commands.Mr. Montes was not oriented to place, time, or circumstance, but knew who he was. Does complain of some pressure like chest pain that radiates to left shoulder - unable to give timeline of when it started, or what makes better/worse. States that he has been taking his seizure medication.    In ED patient hemodynamically stable, had CT head which was negative and no further seizure like activity. Patient given 500 mg IV depakote and neurology was consulted.    Review of Systems: ROS limited due to patient's AMS   Review of Systems   Constitutional: Negative for chills and fever.   HENT: Negative for congestion and hearing loss.    Eyes: Negative for blurred vision and double vision.   Respiratory: Negative for cough, hemoptysis and shortness of breath.    Cardiovascular: Positive for chest pain. Negative for palpitations.   Gastrointestinal: Negative for abdominal pain, nausea and vomiting.   Genitourinary: Negative for dysuria and urgency.   Musculoskeletal: Negative for back pain, falls and neck pain.   Neurological: Positive for dizziness, seizures and weakness. Negative for tingling and headaches.   Psychiatric/Behavioral: Negative for depression and suicidal ideas.       Past Medical History:   Past Medical History was reviewed with patient.   has a past medical history of Seizure disorder (HCC).    Past Surgical History: Past  Surgical History was reviewed with patient.   has no past surgical history on file.    Medications: Medications have been reviewed with patient.  Prior to Admission Medications   Prescriptions Last Dose Informant Patient Reported? Taking?   Divalproex Sodium (DEPAKOTE PO)   Yes Yes   Sig: Take  by mouth.   Lacosamide (VIMPAT PO)   Yes Yes   Sig: Take  by mouth.      Facility-Administered Medications: None        Allergies: Allergies have been not reviewed with patient.  Not on File    Family History: Patient unable to give family history due to altered mental status.  family history is not on file.     Social History:   Unable to answer social history questions due to altered mental status, however did deny drug use.    Primary Care Provider: not reviewed No primary care provider on file.    Physical Exam:   Vitals:  Temp:  [36.3 °C (97.4 °F)-36.7 °C (98.1 °F)] 36.3 °C (97.4 °F)  Pulse:  [72-97] 81  Resp:  [10-18] 17  BP: (139-173)/() 157/100  SpO2:  [93 %-97 %] 95 %    Physical Exam  Constitutional:       Appearance: He is well-developed. He is ill-appearing.      Interventions: He is restrained.      Comments: Disoriented, in restraints   HENT:      Head: Normocephalic and atraumatic.      Mouth/Throat:      Mouth: Mucous membranes are dry.      Comments: No tongue laceration  Eyes:      Pupils: Pupils are equal, round, and reactive to light.   Neck:      Musculoskeletal: Full passive range of motion without pain. No neck rigidity.   Cardiovascular:      Rate and Rhythm: Normal rate and regular rhythm.      Pulses: Normal pulses.      Heart sounds: Normal heart sounds, S1 normal and S2 normal.   Pulmonary:      Effort: Pulmonary effort is normal.      Breath sounds: Normal breath sounds and air entry.   Abdominal:      General: Abdomen is flat. Bowel sounds are normal.      Palpations: Abdomen is soft.      Tenderness: There is no abdominal tenderness.   Musculoskeletal:         General: No swelling.    Skin:     Findings: No rash.   Neurological:      Mental Status: He is lethargic and disoriented.      Sensory: Sensation is intact.      Motor: Weakness present.      Gait: Gait abnormal.      Comments: Bilateral leg weakness with leg extension.         Labs:   Lab Results   Component Value Date/Time    SODIUM 140 07/01/2020 05:02 PM    POTASSIUM 3.4 (L) 07/01/2020 05:02 PM    CHLORIDE 101 07/01/2020 05:02 PM    CO2 25 07/01/2020 05:02 PM    GLUCOSE 90 07/01/2020 05:02 PM    BUN 10 07/01/2020 05:02 PM    CREATININE 1.04 07/01/2020 05:02 PM      Lab Results   Component Value Date/Time    WBC 4.0 (L) 07/01/2020 05:02 PM    RBC 4.99 07/01/2020 05:02 PM    HEMOGLOBIN 14.7 07/01/2020 05:02 PM    HEMATOCRIT 45.9 07/01/2020 05:02 PM    MCV 92.0 07/01/2020 05:02 PM    MCH 29.5 07/01/2020 05:02 PM    MCHC 32.0 (L) 07/01/2020 05:02 PM    MPV 10.8 07/01/2020 05:02 PM    NEUTSPOLYS 64.60 07/01/2020 05:02 PM    LYMPHOCYTES 24.30 07/01/2020 05:02 PM    MONOCYTES 9.70 07/01/2020 05:02 PM    EOSINOPHILS 1.00 07/01/2020 05:02 PM    BASOPHILS 0.20 07/01/2020 05:02 PM        Imaging:   CT Head W/O  Impression: No acute intracranial abnormality is identified. Mild Atrophy.    Assessment & Plan:  Acute encephalopathy  Patient presents to ED with witnessed seizure activity that is different from baseline and has continued somnolence most likely due to a post-ictal state. Because of prolonged somnolence, will continue to keep NPO at midnight and have evaluated for swallow study with Speech pathology in AM. Consider checking TSH, B12; no elevated WBC or nuchal rigidity to suggest meningitis or infectious cause.  -Q4h neuro check  -EEG  -continue depakote 500 BID  -vimpat per neuro  -Consult speech/language pathology for swallow study  -Consult PT/OT  - Neurology consult placed    Witnessed seizure (HCC)  Patient had witnessed seizure that was different from baseline seizures. States that he takes his seizure medications and doesn't have  any other medical history for which he takes medications. Patient does not have any tongue lacerations.  - Admit to neuro  - continue depakote 500 BID  - q4h neuro check  - Neurology consult placed    Chest pain  Patient complains of sternal chest pain that radiates to left shoulder but does not radiate to jaw or further into chest. Pain is able to be reproduced on physical exam when pressing on sternum and left side of chest. Patient has not received ECG, but should just to rule out any concerning cardiac differentials.  -ECG  -cardiac monitor in Neuro  -troponin trend q6h  -lovenox for DVT prophylaxis    Urinary frequency  Per nursing, patient has had frequent urination with light colored urine output. Patient denies having history of diabetes, but possible it is manifesting now vs he may have ingested something before his seizure activity, although tox screen was negative.  -Hgb A1C in AM  - Urinalysis      Angi Lozano M.D.  PGY1 Internal Medicine

## 2020-07-02 NOTE — ASSESSMENT & PLAN NOTE
Patient presented to the ED after seizure activity and was continuing to have a prolonged post-ictal state. Neurology was consulted and obtained EEG and MRI which were both unremarkable. Infectious and metabolic causes were ruled out as well. Patient continued to slowly improve. PT/OT evaluated patient and recommended post-acute services. Discussed with daughter and they were okay with pt being discharged with home health.   - Resolved   - Home health, discharge with walker

## 2020-07-02 NOTE — ED TRIAGE NOTES
Witnessed seizure at home with hx of seizures.  No injury or incontinence noted.  Took last dose of vimpat yesterday, started depakote today.  Vomiting on scene ,ems gave Zofran 4mg pta. Tearful with clear speech on arrival, however will not answer questions.  per ems. MD to bedside

## 2020-07-02 NOTE — PROGRESS NOTES
Report received from ED RN.   Pt arrived on unit in no acute distress. SBP elevated to 160s, all other vitals stable. satting >90% on RA, breathing unlabored.   Tele &  monitoring in place.   Posey vest restraint on per orders. SCDs on. Bed alarm on.     Pt is drowsy but arousable. Oriented to self, place and situation intermittently. Pt is slow to answer questions, but is able to answer most questions. Pt began falling asleep during admissions questions, unable to complete.     Claudia Yanes came to room, dropped off some of pt belongings including cell phone. Daughter will take pt's wallet home, pt agreed. Pt okay for health information to be given to Farzana randall.

## 2020-07-02 NOTE — CONSULTS
[facility] Neurology Consult:    Referring Physician: Benjamin Isabel M.D.    Reason for consultation: Seizures    HPI: Wallace Crow is a 60 y.o. male with history of epilepsy on Depakote/Vimpat presenting to the hospital for breakthrough seizures and consulted for breakthrough seizures. Pt is post-ictal and hx is difficult to obtain. Pt does not recall what happened. He does state he recently moved into town, however cannot state from where. He denies missing medications and does not endorse recent stress or illness. He states he takes Depakote and Vimpat however doses are unknown by patient currently. He has no other complaints. He did not bite his tongue. He states he sometimes gets weak on his R side after a sz. He has had epilepsy since 1988.     ROS: Unable to obtain due to encephalopathy.     Past Medical History:    has a past medical history of Seizure disorder (HCC).    FHx:  No Fhx of seizures/epilepsy.     SHx:   Unable to obtain due to AMS.     Allergies:  Not on File    Medications:    Current Facility-Administered Medications:   •  senna-docusate (PERICOLACE or SENOKOT S) 8.6-50 MG per tablet 2 Tab, 2 Tab, Oral, BID **AND** polyethylene glycol/lytes (MIRALAX) PACKET 1 Packet, 1 Packet, Oral, QDAY PRN **AND** magnesium hydroxide (MILK OF MAGNESIA) suspension 30 mL, 30 mL, Oral, QDAY PRN **AND** bisacodyl (DULCOLAX) suppository 10 mg, 10 mg, Rectal, QDAY PRN, Evert Zelaya D.O.  •  NS infusion, , Intravenous, Continuous, Evert Zelaya D.O., Last Rate: 83 mL/hr at 07/01/20 2302  •  [START ON 7/2/2020] enoxaparin (LOVENOX) inj 40 mg, 40 mg, Subcutaneous, DAILY, Evert Zelaya D.O.  •  [START ON 7/2/2020] divalproex (DEPAKOTE) delayed-release tablet 250 mg, 250 mg, Oral, Q8HRS, Evert Zelaya D.O.  •  LORazepam (ATIVAN) injection 1 mg, 1 mg, Intravenous, Q4HRS PRN, Evert Zelaya D.O.    Vitals:   Vitals:    07/01/20 2059 07/01/20 2100 07/01/20 2127 07/01/20 2140   BP:  146/84      Pulse:  90 85 97   Resp:       Temp:       TempSrc:       SpO2:  96% 94% 96%   Weight:       Height:           Labs:  No results found for: PROTHROMBTM, INR   Lab Results   Component Value Date/Time    WBC 4.0 (L) 07/01/2020 05:02 PM    RBC 4.99 07/01/2020 05:02 PM    HEMOGLOBIN 14.7 07/01/2020 05:02 PM    HEMATOCRIT 45.9 07/01/2020 05:02 PM    MCV 92.0 07/01/2020 05:02 PM    MCH 29.5 07/01/2020 05:02 PM    MCHC 32.0 (L) 07/01/2020 05:02 PM    MPV 10.8 07/01/2020 05:02 PM    NEUTSPOLYS 64.60 07/01/2020 05:02 PM    LYMPHOCYTES 24.30 07/01/2020 05:02 PM    MONOCYTES 9.70 07/01/2020 05:02 PM    EOSINOPHILS 1.00 07/01/2020 05:02 PM    BASOPHILS 0.20 07/01/2020 05:02 PM      Lab Results   Component Value Date/Time    SODIUM 140 07/01/2020 05:02 PM    POTASSIUM 3.4 (L) 07/01/2020 05:02 PM    CHLORIDE 101 07/01/2020 05:02 PM    CO2 25 07/01/2020 05:02 PM    GLUCOSE 90 07/01/2020 05:02 PM    BUN 10 07/01/2020 05:02 PM    CREATININE 1.04 07/01/2020 05:02 PM          Lab Results   Component Value Date/Time    ALKPHOSPHAT 48 07/01/2020 05:02 PM    ASTSGOT 12 07/01/2020 05:02 PM    ALTSGPT 12 07/01/2020 05:02 PM    TBILIRUBIN 0.3 07/01/2020 05:02 PM      Imaging/Testing:  CT Head W/O CST personally reviewed w/o evidence of acute stroke or hemorrhage      Physical Exam:     General: 61 y/o confused male in bed in NAD  Cardio: Normal S1/S2. No peripheral edema.   Pulm: CTAX2. No respiratory distress.   Skin: Warm, dry, no rashes or lesions   Psychiatric: Unable to assess due to AMS.   HEENT: Atraumatic head, normal sclera and conjunctiva, moist oral mucosa. No lid lag.  Abdomen: Soft, non tender. No masses or hepatosplenomegaly.    Neurologic:  Mental Status: Awake, alert. Oriented to person and situation. Able to follow commands. Speech is fluent w/ mild dysarthria. Language functions WNL. No neglect.   Cranial Nerves:  PERRL. EOMi. Face symmetric  Motor:  Normal muscle tone and bulk. Strength is 4+/5 on the L and  4-/5 on the R.   Reflexes: Deferred  Coordination: Deferred  Sensation: Normal to light touch throughout  Gait/Station: Deferred      Assessment/Plan:    Wallace Crow is a 60 y.o. male with history of epilepsy on Depakote/Vimpat presenting to the hospital for breakthrough seizures and consulted for breakthrough seizures. Etiology of breakthrough seizures is unknown as patient endorses taking medications and appears therapeutic in the E.R. Will continue medications for now, and hopefully as post-ictal state clears better hx can be obtained. Will also place referral for neurology epilepsy clinic. 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     Plan:  -Unknown Depakote home dose. Will initiate 500mg BID here.  -Unknown Vimpat home dose. Will initiate 100mg BID here.  -EEG in the am.  -MR Brain W/O CST when able.  -Neurology clinic referral placed.   -Plan discussed with consulting physician and patient's nurse.     Tee Haines M.D., Diplomat of the American Board of Psychiatry and Neurology  Diplomat of [facility] Epilepsy Subspecialty   Assistant Clinical Professor, [facility] Neurology Consultant

## 2020-07-02 NOTE — ED NOTES
Pt naked, ambulating in room. Still does not answer questions or follow any commands.  Assisted back to bed, new gown and linens placed, tv on

## 2020-07-02 NOTE — NON-PROVIDER
Daily Progress Note:     Date of Service: 7/2/2020  Primary Team: [facility] Team   Attending: eBnjamin Isabel M.D.   Senior Resident: Dr. Roblero  Intern: Dr. Jose  Contact:  [phone]    ID:  Mr. Crow is a 59 yo male with a hx of seizures on Vimpat (lacosamide) and Keppra (levetiracetam) and unknown full PMHx who presented with altered mental status.     24-Hour Events:  No acute events. Afebrile.    Subjective:   Difficulty gathering history from patient as he was still confused. He recalls falling and then waking up with doctors surrounding him. Patient reported a cough, headache, and dizziness when seen by  this morning. Patient is also reporting pain in his left shoulder, chest, and sacral region.     Interval Events:  On Vimpat (lacosamide) and Depacon (valproate)    Consultants/Specialty:  Neurology   Speech pathology for swallow study - failed swallow study 7/2  PT/OT    Review of Systems:    Review of Systems   Respiratory: Positive for cough.    Cardiovascular: Positive for chest pain.   Gastrointestinal: Negative for abdominal pain.   Musculoskeletal: Positive for falls and joint pain.   Neurological: Positive for dizziness and headaches.   Psychiatric/Behavioral: Positive for memory loss. The patient is nervous/anxious.      Objective Data:   Physical Exam:   Vitals:   Temp:  [36.2 °C (97.2 °F)-36.7 °C (98.1 °F)] 36.2 °C (97.2 °F)  Pulse:  [72-97] 76  Resp:  [10-18] 17  BP: (134-173)/() 134/80  SpO2:  [92 %-97 %] 92 % on room air    Physical Exam  Constitutional:       Appearance: Normal appearance. He is not ill-appearing or toxic-appearing.      Comments: Patient was emotionally distressed with decreased attention throughout exam.    HENT:      Mouth/Throat:      Mouth: Mucous membranes are moist.   Eyes:      General: No scleral icterus.     Comments: Conjunctival injection   Cardiovascular:      Rate and Rhythm: Normal rate and regular rhythm.      Pulses: Normal pulses.       Heart sounds: No murmur.   Pulmonary:      Breath sounds: Normal breath sounds. No wheezing.      Comments: Poor effort. Tenderness to palpation on left chest and left shoulder.   Chest:      Chest wall: Tenderness present.   Abdominal:      General: There is no distension.      Palpations: Abdomen is soft.      Tenderness: There is no abdominal tenderness.   Musculoskeletal:      Right lower leg: No edema.      Left lower leg: No edema.      Comments: Clavicles symmetrical, full passive ROM of bilateral shoulders.    Skin:     General: Skin is warm and dry.   Neurological:      Mental Status: He is disoriented.      Cranial Nerves: Cranial nerve deficit present.      Sensory: No sensory deficit.      Motor: Weakness present.      Comments: AO x2 (person, place)  Patient with intermittent decreased attention throughout exam (difficulty following instructions fully)  CN XII deficit in early am, improved later in the morning  Muscle strength 3/5 for BUE and BLE (however, may be due to poor effort/difficulty following instructions)       Labs:   Hgb 13.7 low/decr (MCV 90.7)     Normal sodium, glucose, calcium, magnesium, Cr, AST/ALT  Normal lipid panel  Valproic acid w/in therapeutic range 93.7 (normal )  UDS - neg  UA - neg   B12 slightly high 958  TSH normal 0.709    Troponin - last 22, 18 on admission    Imaging:   Head CT w/o contrast - no hemorrhage  EKG - sinus rhythm, no signs of infarction  CXR - normal, no signs of pneumonia  EEG - negative for subclinical seizures    Assessment and Plan:  Mr. Crow is a 59 yo male with a hx of seizures on Vimpat (lacosamide) and Keppra (levetiracetam) who presented with altered mental status after a witnessed seizure-like activity at home by family.     # Altered mental status  Hx consistent with seizure. Unable to characterize seizure type. Prolonged post-ictal confusion concerning for status epilepticus especially with unknown duration of seizure episode.  Unknown precipitant, but may be 2/2 to poor medication compliance. Will need to clarify with family. Other etiologies for seizures less likely due to patient presentation (no metabolic abnormalities, no fever/signs of infection, normal UA, negative UDS and BAL, normal TSH, slightly elevated B12). Other differentials for altered mental status include syncope (possibly 2/2 paroxysmal a-fib), however, patient has normal EKG and prolonged post-ictal confusion very unlikely in syncope. TIA/stroke also very unlikely to cause loss of consciousness.   - Neurochecks q4h  - Aspiration/seizure/fall precautions  - Continue Valproate 500 BID and Lacosamide 100mg BID  - EEG results negative for current subclinical seizures.   - Ordered thiamine levels, T. palladum Ab, ammonia  - MRI w/ and w/o contrast to rule out mass/infectious cause of seizure  - Neurology consulted.  - Diet NPO (passed swallow study). Continue IVF.     # Chest pain/L shoulder pain  CP with tenderness to palpation. EKG negative for MI. Troponin mildly elevated. Along with L shoulder tenderness and sacral tenderness, this chest pain is likely related to injury from patient's fall. Unlikely to be MI at this time.   - Lidocaine cream for pain PRN  - X-ray of left shoulder, left hip, and left knee

## 2020-07-02 NOTE — ASSESSMENT & PLAN NOTE
Patient with history of seizures presented after a witnessed seizure. Patient's Depakote level on admission was therapeutic and it is unclear why patient had a breakthrough seizure.   - Continue Depakote   - Continue Lacosomide   - Follow up with home neurologist

## 2020-07-02 NOTE — PROCEDURES
ROUTINE ELECTROENCEPHALOGRAM REPORT      Referring provider: Dr. Evert Zelaya D.O.    DOS: 7/2/20 (total recording of 25 minutes)    INDICATION:  Wallace Crow 60 y.o. male presenting with seizures    CURRENT ANTIEPILEPTIC REGIMEN: Depakote, Vimpat    TECHNIQUE: 30 channel routine electroencephalogram (EEG) was performed in accordance with the international 10-20 system. The study was reviewed in bipolar and referential montages. The recording examined the patient during wakeful and drowsy/sleep state(s).     DESCRIPTION OF THE RECORD:  The EEG in the most alert state shows a 9 Hz activity over the posterior head regions. There is prominent drowsiness/sleep throughout the record. No epileptiform discharges or subclinical seizures were seen. During the sleep recording symmetric POSTS, sleep spindles and K complexes were seen.     ACTIVATION PROCEDURES:   Photic stimulation did not produce a driving response. Hyperventilation was not performed.       ICTAL AND/OR INTERICTAL FINDINGS:   No focal or generalized epileptiform activity noted. No regional slowing was seen during this routine study.  No clinical events or seizures were reported or recorded during the study.     EKG: sampling of the EKG recording demonstrated sinus rhythm.       INTERPRETATION:  This is a normal limited wakefulness through N2 sleep EEG. No epileptiform discharges or subclinical seizures were seen. Prominent drowsiness may be secondary to external factors, but can also be a feature of mild encephalopathy.     Tee Haines M.D., Diplomat of the American Board of Psychiatry and Neurology  Diplomat of [facility] Epilepsy Subspecialty   Assistant Clinical Professor, [facility] Neurology Consultant

## 2020-07-02 NOTE — SENIOR ADMIT NOTE
Senior Admit Note    Wallace Crow is a 60 y.o. male with a history of seizures who presents to the hospital with witnessed seizure-like activity.  Information was obtained primarily from chart review as the patient's mentation was altered.  Patient was apparently at home when he had seizure-like activity.  He was reportedly taking Vimpat and Keppra at home.  Unable to gain further information on how the patient's seizure presented.  Unable to contact family at this time and the patient does not recall the events leading up to or after his seizure.    In the ED, the patient was started on IV Depakote.    Pertinent physical exam  General: Agitated, in restraints  Neck: No nuchal rigidity  Heart: Regular rate and rhythm.  Negative S3.  Negative S4.  Lungs: Clear to auscultation throughout.  Neuro: Alert and oriented to self place and time.  Uncooperative with complete neuro exam.  Moves all extremities spontaneously.  Intermittently speaks in full sentences.    Labs:  - WBC 4, hemoglobin 14.7, hematocrit 45.9  - Potassium 3.4  - Valproic acid 93.7    Assessment and plan    #Acute encephalopathy  - Patient likely in postictal state status post seizure  - Differential diagnosis also includes metabolic versus infectious causes.  -UDS negative, alcohol low.  Denies history of alcohol abuse  - Neurology consulted believes patient is in postictal state  Plan:  - Admit to neuro  -Every 4 hour neuro checks  -Aspiration, seizure, fall precautions  - Vimpat and Keppra per neuro recommendations  - Check TSH, B12  --Consider LP/MRI if mentation does not improve  -Appreciate recommendations from neurology    #Witnessed seizure activity  -See above    Please see Dr. Lozano's H&P for full detals note so you could     Evert Zelaya D.O., [facility] Internal Medicine Resident

## 2020-07-02 NOTE — PROGRESS NOTES
Spoke to daughter, Farzana, over the phone to complete MRI screening. Per daughter, her mother who is Wallace's wife, Diamond, is driving into town to be present at bedside. Informed Daughter that her father remains very disoriented and as she is the only family member listed in the chart that all communication regarding her father must go through her. She can update her brothers if they have questions. Until patient is more oriented and is able to give us consent to speak to his sons or they have documentation proving that Diamond is spouse and there is no POA, information will be given solely to Farzana.

## 2020-07-02 NOTE — THERAPY
Physical Therapy   Initial Evaluation     Patient Name: Wallace Crow  Age:  60 y.o., Sex:  male  Medical Record #: [id number]  Today's Date: 7/2/2020     Precautions: Fall Risk    Assessment  Patient is 60 y.o. male admitted 7/1/2020 after a witnessed seizure. Pt is visiting family in [place], lives with adult daughter and her SO in [place]. Per EMR pt did not use AD for ambulation. Pt presents today with significantly impaired functional mobility, demonstrating B LE weakness, balance deficits, poor activity tolerance. PT will cont to follow during acute stay.  Plan    Recommend Physical Therapy 3 times per week until therapy goals are met for the following treatments:  Bed Mobility, Equipment, Gait Training, Neuro Re-Education / Balance, Self Care/Home Evaluation, Therapeutic Activities and Therapeutic Exercises    Discharge recommendations:  Recommend post-acute placement for continued physical therapy services prior to discharge home.         Subjective    Pt agrees to PT.     Objective       07/02/20 0917   Prior Living Situation   Prior Services Intermittent Physical Support for ADL Per Family   Housing / Facility 1 Story House   Equipment Owned Unable to Determine At This Time   Lives with - Patient's Self Care Capacity Adult Children   Prior Level of Functional Mobility   Bed Mobility Independent   Transfer Status Independent   Ambulation Independent   Distance Ambulation (Feet) 150   Assistive Devices Used None   Stairs Unable To Determine At This Time   History of Falls   History of Falls Yes   Date of Last Fall 07/01/20   Cognition    Speech/ Communication Delayed Responses   Orientation Level Not Oriented to Month;Not Oriented to Time   Level of Consciousness Confused   Ability To Follow Commands 2 Step   Comments cooperative   Passive ROM Lower Body   Passive ROM Lower Body WDL   Active ROM Lower Body    Gross Active ROM Gross Active Range of Motion Impaired,  but Appears Adequate for Functional Mobility.    Strength Lower Body   Gross Strength Generalized Weakness, Equal Bilaterally   Sensation Lower Body   Lower Extremity Sensation   Not Tested   Balance Assessment   Sitting Balance (Static) Fair -   Sitting Balance (Dynamic) Poor +   Standing Balance (Static) Poor   Standing Balance (Dynamic) Poor -   Weight Shift Sitting Poor   Weight Shift Standing Poor   Comments with FWW   Gait Analysis   Gait Level Of Assist Unable to Participate   Bed Mobility    Supine to Sit Maximal Assist   Sit to Supine Maximal Assist   Scooting Maximal Assist   Rolling Maximum Assist to Lt.   Skilled Intervention Verbal Cuing;Tactile Cuing;Sequencing;Facilitation   Comments HOB elevated   Functional Mobility   Sit to Stand Maximal Assist   Bed, Chair, Wheelchair Transfer Unable to Participate   Toilet Transfers Unable to Participate   Mobility 3 lateral steps at EOB with FWW   Skilled Intervention Verbal Cuing;Tactile Cuing;Sequencing;Facilitation   Patient / Family Goals    Patient / Family Goal #1 none stated   Short Term Goals    Short Term Goal # 1 Pt will be SPV for supine<>sit without bed feautures in 6 txs to improve functional mobility.   Short Term Goal # 2 Pt will be SPV for all transfers with FWW in 6 txs to improve functional mobility.   Short Term Goal # 3 Pt will be SPV for gait >150' with FWW in 6 txs to improve functional mobility.   Education Group   Role of Physical Therapist Patient Response Patient;Acceptance;Explanation;Demonstration;Reinforcement Needed   Problem List    Problems Pain;Impaired Bed Mobility;Impaired Transfers;Impaired Ambulation;Functional Strength Deficit;Impaired Balance;Decreased Activity Tolerance;Safety Awareness Deficits / Cognition   Anticipated Discharge Equipment   DC Equipment Unable To Determine At This Time

## 2020-07-02 NOTE — ED NOTES
Pt up ambulating in room tangled in monitor wires. Disconnected from monitor to prevent tripping and falling, assisted back to bed.  Still has clear speech when he speaks but does not follow commands or answer questions. MD to bedside

## 2020-07-02 NOTE — PROGRESS NOTES
Neurology Progress Note  Neurohospitalist Service, [facility]    Referring Physician: Benjamin Isabel M.D.    Chief Complaint   Patient presents with   • ALOC       HPI: Refer to initial documented Neurology H&P, as detailed in the patient's chart.    Interval History: No acute events overnight. Patient unable to provide any history, still confused this morning.     Review of systems: In addition to what is detailed in the HPI and/or updated in the interval history, all other systems reviewed and are negative.    Past Medical History:    has a past medical history of Seizure disorder (HCC).    FHx:  family history is not on file.    SHx:   reports previous alcohol use.    Medications:    Current Facility-Administered Medications:   •  LORazepam (ATIVAN) injection 1-2 mg, 1-2 mg, Intravenous, Q4HRS PRN, Iraj Roblero, D.O.  •  LORazepam (ATIVAN) injection 1 mg, 1 mg, Intravenous, Once, Irthever Roblero, D.O., Stopped at 07/02/20 1000  •  lidocaine-prilocaine (EMLA) 2.5-2.5 % cream, , Topical, PRN, Pratibha Jose M.D.  •  valproate (DEPACON) 500 mg in D5W 50 mL IVPB, 500 mg, Intravenous, Q12HRS, Pratibha Jose M.D.  •  senna-docusate (PERICOLACE or SENOKOT S) 8.6-50 MG per tablet 2 Tab, 2 Tab, Oral, BID, 2 Tab at 07/02/20 0520 **AND** polyethylene glycol/lytes (MIRALAX) PACKET 1 Packet, 1 Packet, Oral, QDAY PRN **AND** magnesium hydroxide (MILK OF MAGNESIA) suspension 30 mL, 30 mL, Oral, QDAY PRN **AND** bisacodyl (DULCOLAX) suppository 10 mg, 10 mg, Rectal, QDAY PRN, Evert Zelaya D.O.  •  NS infusion, , Intravenous, Continuous, Evert Zelaya D.O., Last Rate: 83 mL/hr at 07/01/20 2302  •  enoxaparin (LOVENOX) inj 40 mg, 40 mg, Subcutaneous, DAILY, Evert Zelaya D.O., 40 mg at 07/02/20 0520  •  lacosamide (VIMPAT) 100 mg in  mL ivpb, 100 mg, Intravenous, Q12HRS, Tee Haines M.D., Stopped at 07/02/20 0158    Physical Examination:     Vitals:    07/02/20 0002  07/02/20 0026 07/02/20 0400 07/02/20 0819   BP:  157/100 134/80 136/92   Pulse:   76 75   Resp:   17 20   Temp:   36.2 °C (97.2 °F) 36.2 °C (97.2 °F)   TempSrc:   Temporal Temporal   SpO2:   92% 95%   Weight: 95.2 kg (209 lb 14.1 oz)      Height:           General: Patient is awake and in no acute distress  Eyes: examination of optic disks not indicated at this time  CV: RRR    NEUROLOGICAL EXAM:     Mental status: Awake, alert and oriented to self and place, not time or event, follows commands intermittently   Speech and language: speech is clear and fluent. The patient is able to name and repeat.  Cranial nerve exam: Pupils are equal, round and reactive to light bilaterally. Able to track face across midline, extraocular muscles are intact. Sensation in the face is intact to light touch. Face is symmetric. Hearing to finger rub equal. Palate elevates symmetrically. Shoulder shrug is full. Tongue is midline.  Motor exam: No pronator drift, Strength is 5/5 in bilateral upper extremities both distally and proximally, strength is at least 3/5 in bilateral lower extremities. Tone is normal. No abnormal movements were seen on exam.  Sensory exam: No sensory deficits identified   Deep tendon reflexes:  2+ and symmetric. Toes down-going bilaterally.  Coordination: no ataxia   Gait: deferred    Objective Data:    Labs:  No results found for: PROTHROMBTM, INR   Lab Results   Component Value Date/Time    WBC 4.8 07/02/2020 05:05 AM    RBC 4.74 07/02/2020 05:05 AM    HEMOGLOBIN 13.7 (L) 07/02/2020 05:05 AM    HEMATOCRIT 43.0 07/02/2020 05:05 AM    MCV 90.7 07/02/2020 05:05 AM    MCH 28.9 07/02/2020 05:05 AM    MCHC 31.9 (L) 07/02/2020 05:05 AM    MPV 10.5 07/02/2020 05:05 AM    NEUTSPOLYS 60.00 07/02/2020 05:05 AM    LYMPHOCYTES 29.90 07/02/2020 05:05 AM    MONOCYTES 8.50 07/02/2020 05:05 AM    EOSINOPHILS 0.80 07/02/2020 05:05 AM    BASOPHILS 0.40 07/02/2020 05:05 AM      Lab Results   Component Value Date/Time    SODIUM 142  07/02/2020 05:04 AM    POTASSIUM 3.7 07/02/2020 05:04 AM    CHLORIDE 103 07/02/2020 05:04 AM    CO2 27 07/02/2020 05:04 AM    GLUCOSE 82 07/02/2020 05:04 AM    BUN 9 07/02/2020 05:04 AM    CREATININE 1.04 07/02/2020 05:04 AM      Lab Results   Component Value Date/Time    CHOLSTRLTOT 159 07/02/2020 05:04 AM    LDL 93 07/02/2020 05:04 AM    HDL 53 07/02/2020 05:04 AM    TRIGLYCERIDE 64 07/02/2020 05:04 AM       Lab Results   Component Value Date/Time    ALKPHOSPHAT 46 07/02/2020 05:04 AM    ASTSGOT 15 07/02/2020 05:04 AM    ALTSGPT 16 07/02/2020 05:04 AM    TBILIRUBIN 0.6 07/02/2020 05:04 AM        Imaging/Testing:    I interpreted and/or reviewed the patient's neuroimaging    DX-CHEST-PORTABLE (1 VIEW)   Final Result      Right perihilar atelectasis favored over scarring      CT-HEAD W/O   Final Result      No acute intracranial abnormality is identified.      Mild atrophy.         MR-BRAIN-WITH & W/O    (Results Pending)   DX-HIP-UNILATERAL-W/O PELVIS-2/3 VIEWS LEFT    (Results Pending)   DX-SHOULDER 1 VIEW LEFT    (Results Pending)   DX-KNEE 2- LEFT    (Results Pending)       Assessment and Plan:    Wallace Crow is a 60 y.o. man presenting for whom neurology has been consulted for presumed seizure with postictal confusion. Given the extended duration of his confusion, unclear if this is indeed post-ictal vs ongoing subclinical seizures vs toxic metabolic encephalopathy. Why he had a seizure in the first place is unclear, adherence is difficult to assess given patient's current mental status, however if he was adherent this could be a breakthrough seizure. Pending EEG, imaging, and further acute encephalopathy workup.     Plan:    -Continue depakote and vimpat   -Complete encephalopathy workup (added B1, T. palladum Ab, ammonia)  -EEG pending  -MRI of the brain w/wo contrast pending   -Infectious causes unlikely, (CXR negative, UA negative, WBC count normal, afebrile, no nuchal rigidity)  -Neurology clinic  referral placed.     The evaluation of the patient, and recommended management, was discussed with the resident staff. I have performed a physical exam and reviewed and updated ROS and Plan today (7/2/2020). In review of yesterday's note (7/1/2020), there are no changes except as documented above.    Liam Golden MD  Medical Director, [facility]  Neurohospitalist, & Vascular Neurology, [facility]  Clinical  of Neurology, [employer]

## 2020-07-02 NOTE — ASSESSMENT & PLAN NOTE
Patient complains of sternal chest pain that radiates to left shoulder but does not radiate to jaw or further into chest. Pain is reproducible on physical exam with palpation of L 4th costochondral joint. ECG unremarkable, troponin elevated to 22, more consistent with vigorous exertion than corinna MI. Initial Ddx for this patient considering his age was Costochondritis vs MI vs GERD. Reproducibility of pain with palpation, localization, nature of pain suggest costochondritis is most likely. Negative workup per above points away from ongoing cardiac injury.   - lidocaine cream for pain  - Tylenol 1000mg TID

## 2020-07-02 NOTE — ED NOTES
Unable to complete. Med rec at this time. No information listed in demographics.    No pharmacy Listed.

## 2020-07-02 NOTE — PROGRESS NOTES
PT and OT stated that patient continues to complain of left shoulder, left knee and left hip pain. MD notified and new orders for imaging received .

## 2020-07-02 NOTE — ED NOTES
Pt stood up and had episode of urinary incontinence despite urinal in reach.  Linens changed, assisted back to bed.

## 2020-07-02 NOTE — ED NOTES
Dr Zelaya verbalized ok to perform bedside swallow and advance diet as tolerated.  Pt passed bedside swallow, drank 4 oz apple juice and fed self 1 container apple sauce

## 2020-07-02 NOTE — CARE PLAN
Problem: Communication  Goal: The ability to communicate needs accurately and effectively will improve  Outcome: PROGRESSING AS EXPECTED  Note: Oriented pt to unit, pt verbalized understanding. Discussed POC with pt, unsure if pt learning occurred.      Problem: Safety  Goal: Will remain free from injury  Outcome: PROGRESSING AS EXPECTED  Goal: Will remain free from falls  Outcome: PROGRESSING AS EXPECTED  Note: Posey vest in place per orders, pt able to move arms, turning q2h, bed alarm on, pt educated on falls safety and educated on call bell usage.

## 2020-07-03 ENCOUNTER — APPOINTMENT (OUTPATIENT)
Dept: RADIOLOGY | Facility: MEDICAL CENTER | Age: 60
DRG: 101 | End: 2020-07-03
Attending: STUDENT IN AN ORGANIZED HEALTH CARE EDUCATION/TRAINING PROGRAM
Payer: COMMERCIAL

## 2020-07-03 LAB
ALBUMIN SERPL BCP-MCNC: 3.7 G/DL (ref 3.2–4.9)
ALBUMIN/GLOB SERPL: 1.5 G/DL
ALP SERPL-CCNC: 52 U/L (ref 30–99)
ALT SERPL-CCNC: 12 U/L (ref 2–50)
AMMONIA PLAS-SCNC: 68 UMOL/L (ref 11–45)
ANION GAP SERPL CALC-SCNC: 10 MMOL/L (ref 7–16)
AST SERPL-CCNC: 11 U/L (ref 12–45)
BILIRUB SERPL-MCNC: 0.5 MG/DL (ref 0.1–1.5)
BUN SERPL-MCNC: 10 MG/DL (ref 8–22)
CALCIUM SERPL-MCNC: 8.9 MG/DL (ref 8.5–10.5)
CHLORIDE SERPL-SCNC: 105 MMOL/L (ref 96–112)
CO2 SERPL-SCNC: 25 MMOL/L (ref 20–33)
CREAT SERPL-MCNC: 0.92 MG/DL (ref 0.5–1.4)
ERYTHROCYTE [DISTWIDTH] IN BLOOD BY AUTOMATED COUNT: 43.9 FL (ref 35.9–50)
GLOBULIN SER CALC-MCNC: 2.5 G/DL (ref 1.9–3.5)
GLUCOSE SERPL-MCNC: 81 MG/DL (ref 65–99)
HCT VFR BLD AUTO: 43.4 % (ref 42–52)
HGB BLD-MCNC: 14.1 G/DL (ref 14–18)
MAGNESIUM SERPL-MCNC: 1.9 MG/DL (ref 1.5–2.5)
MCH RBC QN AUTO: 29 PG (ref 27–33)
MCHC RBC AUTO-ENTMCNC: 32.5 G/DL (ref 33.7–35.3)
MCV RBC AUTO: 89.3 FL (ref 81.4–97.8)
PLATELET # BLD AUTO: 176 K/UL (ref 164–446)
PMV BLD AUTO: 11.1 FL (ref 9–12.9)
POTASSIUM SERPL-SCNC: 3.8 MMOL/L (ref 3.6–5.5)
PROT SERPL-MCNC: 6.2 G/DL (ref 6–8.2)
RBC # BLD AUTO: 4.86 M/UL (ref 4.7–6.1)
SODIUM SERPL-SCNC: 140 MMOL/L (ref 135–145)
WBC # BLD AUTO: 4.2 K/UL (ref 4.8–10.8)

## 2020-07-03 PROCEDURE — 770001 HCHG ROOM/CARE - MED/SURG/GYN PRIV*

## 2020-07-03 PROCEDURE — 73030 X-RAY EXAM OF SHOULDER: CPT | Mod: LT

## 2020-07-03 PROCEDURE — 92526 ORAL FUNCTION THERAPY: CPT

## 2020-07-03 PROCEDURE — 700105 HCHG RX REV CODE 258: Performed by: PSYCHIATRY & NEUROLOGY

## 2020-07-03 PROCEDURE — A9270 NON-COVERED ITEM OR SERVICE: HCPCS | Performed by: STUDENT IN AN ORGANIZED HEALTH CARE EDUCATION/TRAINING PROGRAM

## 2020-07-03 PROCEDURE — 700111 HCHG RX REV CODE 636 W/ 250 OVERRIDE (IP): Performed by: PSYCHIATRY & NEUROLOGY

## 2020-07-03 PROCEDURE — 73502 X-RAY EXAM HIP UNI 2-3 VIEWS: CPT | Mod: LT

## 2020-07-03 PROCEDURE — 700105 HCHG RX REV CODE 258: Performed by: STUDENT IN AN ORGANIZED HEALTH CARE EDUCATION/TRAINING PROGRAM

## 2020-07-03 PROCEDURE — 99233 SBSQ HOSP IP/OBS HIGH 50: CPT | Performed by: PSYCHIATRY & NEUROLOGY

## 2020-07-03 PROCEDURE — 700102 HCHG RX REV CODE 250 W/ 637 OVERRIDE(OP): Performed by: STUDENT IN AN ORGANIZED HEALTH CARE EDUCATION/TRAINING PROGRAM

## 2020-07-03 PROCEDURE — 36415 COLL VENOUS BLD VENIPUNCTURE: CPT

## 2020-07-03 PROCEDURE — 700111 HCHG RX REV CODE 636 W/ 250 OVERRIDE (IP): Performed by: STUDENT IN AN ORGANIZED HEALTH CARE EDUCATION/TRAINING PROGRAM

## 2020-07-03 PROCEDURE — 85027 COMPLETE CBC AUTOMATED: CPT

## 2020-07-03 PROCEDURE — 83735 ASSAY OF MAGNESIUM: CPT

## 2020-07-03 PROCEDURE — 99232 SBSQ HOSP IP/OBS MODERATE 35: CPT | Mod: GC | Performed by: INTERNAL MEDICINE

## 2020-07-03 PROCEDURE — 82140 ASSAY OF AMMONIA: CPT

## 2020-07-03 PROCEDURE — 97116 GAIT TRAINING THERAPY: CPT

## 2020-07-03 PROCEDURE — 80053 COMPREHEN METABOLIC PANEL: CPT

## 2020-07-03 PROCEDURE — 97530 THERAPEUTIC ACTIVITIES: CPT

## 2020-07-03 PROCEDURE — C9254 INJECTION, LACOSAMIDE: HCPCS | Performed by: PSYCHIATRY & NEUROLOGY

## 2020-07-03 PROCEDURE — 73560 X-RAY EXAM OF KNEE 1 OR 2: CPT | Mod: LT

## 2020-07-03 RX ORDER — ACETAMINOPHEN 500 MG
1000 TABLET ORAL 3 TIMES DAILY
Status: DISCONTINUED | OUTPATIENT
Start: 2020-07-03 | End: 2020-07-04 | Stop reason: HOSPADM

## 2020-07-03 RX ORDER — MAGNESIUM SULFATE HEPTAHYDRATE 40 MG/ML
2 INJECTION, SOLUTION INTRAVENOUS ONCE
Status: COMPLETED | OUTPATIENT
Start: 2020-07-03 | End: 2020-07-03

## 2020-07-03 RX ORDER — LACOSAMIDE 100 MG/1
100 TABLET ORAL 2 TIMES DAILY
Status: DISCONTINUED | OUTPATIENT
Start: 2020-07-03 | End: 2020-07-04 | Stop reason: HOSPADM

## 2020-07-03 RX ORDER — DIVALPROEX SODIUM 500 MG/1
500 TABLET, DELAYED RELEASE ORAL EVERY 12 HOURS
Status: DISCONTINUED | OUTPATIENT
Start: 2020-07-03 | End: 2020-07-04 | Stop reason: HOSPADM

## 2020-07-03 RX ADMIN — DIVALPROEX SODIUM 500 MG: 500 TABLET, DELAYED RELEASE ORAL at 17:43

## 2020-07-03 RX ADMIN — SODIUM CHLORIDE 100 MG: 9 INJECTION, SOLUTION INTRAVENOUS at 00:51

## 2020-07-03 RX ADMIN — MAGNESIUM SULFATE 2 G: 2 INJECTION INTRAVENOUS at 13:49

## 2020-07-03 RX ADMIN — VALPROATE SODIUM 500 MG: 100 INJECTION, SOLUTION INTRAVENOUS at 02:00

## 2020-07-03 RX ADMIN — LACOSAMIDE 100 MG: 100 TABLET, FILM COATED ORAL at 17:43

## 2020-07-03 RX ADMIN — ACETAMINOPHEN 1000 MG: 500 TABLET ORAL at 11:38

## 2020-07-03 RX ADMIN — ACETAMINOPHEN 1000 MG: 500 TABLET ORAL at 17:43

## 2020-07-03 RX ADMIN — DOCUSATE SODIUM 50 MG AND SENNOSIDES 8.6 MG 2 TABLET: 8.6; 5 TABLET, FILM COATED ORAL at 17:43

## 2020-07-03 RX ADMIN — VALPROATE SODIUM 500 MG: 100 INJECTION, SOLUTION INTRAVENOUS at 11:04

## 2020-07-03 RX ADMIN — ENOXAPARIN SODIUM 40 MG: 40 INJECTION, SOLUTION INTRAVENOUS; SUBCUTANEOUS at 05:02

## 2020-07-03 RX ADMIN — SODIUM CHLORIDE: 9 INJECTION, SOLUTION INTRAVENOUS at 08:05

## 2020-07-03 RX ADMIN — SODIUM CHLORIDE 100 MG: 9 INJECTION, SOLUTION INTRAVENOUS at 12:27

## 2020-07-03 RX ADMIN — LIDOCAINE AND PRILOCAINE 1 APPLICATION: 25; 25 CREAM TOPICAL at 13:58

## 2020-07-03 ASSESSMENT — COGNITIVE AND FUNCTIONAL STATUS - GENERAL
TURNING FROM BACK TO SIDE WHILE IN FLAT BAD: A LITTLE
SUGGESTED CMS G CODE MODIFIER MOBILITY: CK
STANDING UP FROM CHAIR USING ARMS: A LITTLE
MOVING FROM LYING ON BACK TO SITTING ON SIDE OF FLAT BED: A LITTLE
WALKING IN HOSPITAL ROOM: A LITTLE
CLIMB 3 TO 5 STEPS WITH RAILING: A LOT
MOVING TO AND FROM BED TO CHAIR: A LITTLE
MOBILITY SCORE: 17

## 2020-07-03 ASSESSMENT — ENCOUNTER SYMPTOMS
HEARTBURN: 0
COUGH: 0
WHEEZING: 0
NAUSEA: 0
TREMORS: 0
DIZZINESS: 1
SPEECH CHANGE: 0
HEADACHES: 0
TINGLING: 0
BLURRED VISION: 0
FOCAL WEAKNESS: 0
PHOTOPHOBIA: 0
DIARRHEA: 0
HEMOPTYSIS: 1
DEPRESSION: 0
SENSORY CHANGE: 0
PND: 0
DOUBLE VISION: 0
HEADACHES: 1
SEIZURES: 0
FEVER: 0
WEAKNESS: 0
CHILLS: 0
VOMITING: 0
SHORTNESS OF BREATH: 0
LOSS OF CONSCIOUSNESS: 0
ABDOMINAL PAIN: 0
CONSTIPATION: 0

## 2020-07-03 ASSESSMENT — PATIENT HEALTH QUESTIONNAIRE - PHQ9
1. LITTLE INTEREST OR PLEASURE IN DOING THINGS: NOT AT ALL
SUM OF ALL RESPONSES TO PHQ9 QUESTIONS 1 AND 2: 0
2. FEELING DOWN, DEPRESSED, IRRITABLE, OR HOPELESS: NOT AT ALL

## 2020-07-03 ASSESSMENT — GAIT ASSESSMENTS
DISTANCE (FEET): 200
DEVIATION: BRADYKINETIC
GAIT LEVEL OF ASSIST: SUPERVISED
ASSISTIVE DEVICE: FRONT WHEEL WALKER

## 2020-07-03 NOTE — PROGRESS NOTES
Brief Neurology Note    The patient's chart has been reviewed.    Patient was down at xray during AM bedside rounds.  Will re-attempt to see at bedside this afternoon.    Liam Golden MD  Medical Director, [facility], [facility]  Neurohospitalist, & Vascular Neurology, [facility]  Clinical  of Neurology, [employer] [facility]

## 2020-07-03 NOTE — PROGRESS NOTES
Pt c/o light headache and stomach upset. Per pt: he will have seizure after these symptoms. RNs helped pt back to bed, all seizure precautions in place. Pt resting, eyes closed, chest rise up and down noted, NAD. MD. Jose notified. Medication on board. Will cont to monitor

## 2020-07-03 NOTE — DISCHARGE PLANNING
Received Choice form at 0915  Agency/Facility Name: [facility]  Referral sent per Choice form @ 0927     [facility] also attached proof of insurance.    1025  Agency/Facility Name: [facility]  Spoke To: Roberto [phone]  Outcome: Referral received and under review.    3313  Agency/Facility Name: [facility]  Spoke To: Roberto  Outcome: Initial review complete. Will meet with doctors @1400 to receive final decision.

## 2020-07-03 NOTE — CARE PLAN
Problem: Safety  Goal: Will remain free from injury  Outcome: PROGRESSING AS EXPECTED  Intervention: Provide assistance with mobility  Note: PT/OT worked with pt: EOB, standing. Free from fall, will cont to monitor

## 2020-07-03 NOTE — CARE PLAN
Problem: Communication  Goal: The ability to communicate needs accurately and effectively will improve  Outcome: PROGRESSING AS EXPECTED   Pt is A&0x3-4 with intermittent confusion and memory retention issues.  Frequently reoriented to situation and plan of care. Hourly rounding in place; will continue to monitor.   Problem: Safety  Goal: Will remain free from falls  Outcome: PROGRESSING AS EXPECTED   Pt in bed with bed locked and low with call light in reach. Bed alarm on. Hourly rounding in place.

## 2020-07-03 NOTE — PROGRESS NOTES
Pt transported to MRI via transport. Tele monitor removed and at bedside in his room. Pre-med for MRI given.

## 2020-07-03 NOTE — PROGRESS NOTES
Per pt's daughter pt is claustrophobic, needs medication to help for taking MRI. MD [facility] paged. Received order: atarax by mouth. Notified MD that pt is high risk of aspiration can't take PO medication. MD stated will order IV route for pt, will cont to monitor

## 2020-07-03 NOTE — DISCHARGE PLANNING
Anticipated Discharge Disposition:   IR    Action:    RN CM obtained a copy of patient's insurance from his daughter Farzana.  Copy emailed to [facility].    PT/OT/SLP recommending inpatient transitional care/post-acute placement.    RN CM spoke with patient's daughter Farzana this a.m. Explained [facility].  Farzana agreeable to referral to [facility].  Choice form faxed to [facility].    Barriers to Discharge:    Medical clearance  Placment acceptance    Plan:    F/U referral.

## 2020-07-03 NOTE — PROGRESS NOTES
@1630 Multiple family members came for visiting hours. Spoke to daughterFarzana that  is listed in chart. Explained that the patient is currently being tested for COVID and until the rapid test results come back negative that it is recommended that she wait to see him. Also explained that only one family member will be allowed to visit per day. Discussed plan of care and tests that are pending.     @1700 daughter was outside room and approached this nurse stating that her father just called her and is confused and she wants to see him now. Explained again that Covid results were not yet available and that it is not recommended at this time. She stated she did not care and walked past this nurse and in to the patient isolation room. Notified charge nurse and he stated he would speak to family regarding this after it was seen that another family member was now at bedside.     @1734 microbiology notified this RN that Covid test is negative.     MRI notified that he is now able to have imgaing done and they said they would send transport up approximately 1830.

## 2020-07-03 NOTE — PROGRESS NOTES
Daily Progress Note:     Date of Service: 7/3/2020  Primary Team: [facility] Team   Attending: Benjamin Isabel M.D.   Senior Resident: Dr. Roblero  Intern: Dr. Jose  Contact:  [phone]    Chief Complaint:   Seizure like activity and fall    ID: Mr Crow is a 60 year old man who was admitted to the hospital for post-ictal state following a seizure which resulted in a fall 07/01/2020    No acute overnight events    subjective: Pt does not recall events leading to admission, but feels a little better than yesterday. He is happy that he is now allowed to eat a full diet and reports good appetite. He did not have a bowel movement yet, and attributes it to NPO since admission.    Consultants/Specialty:  Neurology    Review of Systems:    Review of Systems   Unable to perform ROS: Mental status change   Constitutional: Negative for chills and fever.   HENT: Negative for hearing loss and tinnitus.    Eyes: Negative for blurred vision, double vision and photophobia.   Respiratory: Positive for hemoptysis. Negative for cough and wheezing.    Cardiovascular: Positive for chest pain (over L parasternal border, localized). Negative for leg swelling and PND.   Gastrointestinal: Negative for abdominal pain, heartburn, nausea and vomiting.   Genitourinary: Positive for frequency. Negative for dysuria and urgency.   Skin: Negative for itching and rash.   Neurological: Positive for dizziness. Negative for tingling, tremors, sensory change, speech change, focal weakness, seizures, loss of consciousness, weakness and headaches.   Psychiatric/Behavioral: Negative for depression and suicidal ideas.       Objective Data:   Physical Exam:     Vitals:   Temp:  [36.2 °C (97.2 °F)-36.4 °C (97.6 °F)] 36.2 °C (97.2 °F)  Pulse:  [60-87] 87  Resp:  [16-17] 16  BP: (131-152)/(75-92) 152/81  SpO2:  [94 %-100 %] 97 %     Physical Exam  Constitutional:       Appearance: Normal appearance. He is normal weight.   HENT:      Head:  Normocephalic and atraumatic.      Nose: Nose normal.      Mouth/Throat:      Mouth: Mucous membranes are moist.      Pharynx: Oropharynx is clear. No oropharyngeal exudate.   Eyes:      General: No scleral icterus.     Extraocular Movements: Extraocular movements intact.      Pupils: Pupils are equal, round, and reactive to light.   Neck:      Musculoskeletal: Normal range of motion and neck supple.   Cardiovascular:      Rate and Rhythm: Normal rate and regular rhythm.      Pulses: Normal pulses.      Heart sounds: Normal heart sounds.   Pulmonary:      Effort: Pulmonary effort is normal. No respiratory distress.      Breath sounds: Normal breath sounds. No stridor. No wheezing, rhonchi or rales.   Chest:      Chest wall: Tenderness (Chest wall tenderness over L paraspinal region over 4th chondrcostal joint) present.   Abdominal:      General: Abdomen is flat. Bowel sounds are normal.      Palpations: Abdomen is soft.   Musculoskeletal:      Right lower leg: No edema.      Left lower leg: No edema.      Comments: PTT L hip and knee. No obvious signs of trauma to joint   Skin:     General: Skin is warm and dry.      Capillary Refill: Capillary refill takes less than 2 seconds.   Neurological:      Mental Status: He is alert and oriented to person, place, and time.      Cranial Nerves: No cranial nerve deficit.      Motor: No weakness.      Gait: Gait abnormal (list to L).      Comments: Oriented to self and place only. Unable to follow commands during examination. Repeated examination later in the day found the patient more directable and able to follow commands. Mid-examination the patient exhibit acute change in mentation and ability to follow commands.            Labs:   Results for DICK HARRINGTON (MRN [id number]) as of 7/3/2020 12:42   Ref. Range 7/2/2020 09:50 7/2/2020 10:58 7/2/2020 14:22 7/2/2020 16:15 7/2/2020 19:37 7/3/2020 03:49 7/3/2020 11:02 7/3/2020 11:03 7/3/2020 11:03   WBC Latest Ref Range: 4.8 - 10.8  K/uL      4.2 (L)      RBC Latest Ref Range: 4.70 - 6.10 M/uL      4.86      Hemoglobin Latest Ref Range: 14.0 - 18.0 g/dL      14.1      Hematocrit Latest Ref Range: 42.0 - 52.0 %      43.4      MCV Latest Ref Range: 81.4 - 97.8 fL      89.3      MCH Latest Ref Range: 27.0 - 33.0 pg      29.0      MCHC Latest Ref Range: 33.7 - 35.3 g/dL      32.5 (L)      RDW Latest Ref Range: 35.9 - 50.0 fL      43.9      Platelet Count Latest Ref Range: 164 - 446 K/uL      176      MPV Latest Ref Range: 9.0 - 12.9 fL      11.1      Sodium Latest Ref Range: 135 - 145 mmol/L      140      Potassium Latest Ref Range: 3.6 - 5.5 mmol/L      3.8      Chloride Latest Ref Range: 96 - 112 mmol/L      105      Co2 Latest Ref Range: 20 - 33 mmol/L      25      Anion Gap Latest Ref Range: 7.0 - 16.0       10.0      Glucose Latest Ref Range: 65 - 99 mg/dL      81      Bun Latest Ref Range: 8 - 22 mg/dL      10      Creatinine Latest Ref Range: 0.50 - 1.40 mg/dL      0.92      GFR If  Latest Ref Range: >60 mL/min/1.73 m 2      >60      GFR If Non  Latest Ref Range: >60 mL/min/1.73 m 2      >60      Calcium Latest Ref Range: 8.5 - 10.5 mg/dL      8.9      AST(SGOT) Latest Ref Range: 12 - 45 U/L      11 (L)      ALT(SGPT) Latest Ref Range: 2 - 50 U/L      12      Alkaline Phosphatase Latest Ref Range: 30 - 99 U/L      52      Total Bilirubin Latest Ref Range: 0.1 - 1.5 mg/dL      0.5      Albumin Latest Ref Range: 3.2 - 4.9 g/dL      3.7      Total Protein Latest Ref Range: 6.0 - 8.2 g/dL      6.2      Globulin Latest Ref Range: 1.9 - 3.5 g/dL      2.5      A-G Ratio Latest Units: g/dL      1.5      Magnesium Latest Ref Range: 1.5 - 2.5 mg/dL      1.9      Ammonia Latest Ref Range: 11 - 45 umol/L   76 (H)   68 (H)      Glycohemoglobin Latest Ref Range: 0.0 - 5.6 %   5.5         Estim. Avg Glu Latest Units: mg/dL   111         COVID Order Status Unknown  Received          SARS-CoV-2 Source Unknown    NP Swab         SARS-CoV-2 (RdRp gene) Unknown    NotDetected        Syphilis, Treponemal Qual Latest Ref Range: Non-Reactive    Non-Reactive             Imaging:   CXR (AP) 07/02: Atelectasis right perihilar region.  CT headw/o con 07/01: No acute intracranial abnormality identified.  EKG: NSR  XR L knee: unremarkable ( awaiting formal read)  XR L Shoulder: unremarkable  ( awaiting formal read)  XR L hip: unremarkable  ( awaiting formal read)    Acute encephalopathy  Assessment & Plan  Patient presents to ED with witnessed seizure activity different from baseline and has continued somnolence, most likely a post-ictal state Vs sub-clinical status epilepticus vs toxic encephalopathy. . Infectious process unlikely (no nuchal rigidity, no photophobia). Although negative Utox is not definitive, toxic encephalopathy is less likely considering no toxins were found in serum.     -advance per speech therapy recc's.  -Q4h neuro check  -EEG  -continue depakote 500 BID  -vimpat IV per neuro  -Continue speech tx  -Continue PT/OT  - Neurology consult placed; recc's appreciated.    Witnessed seizure (HCC)  Assessment & Plan  Patient had witnessed seizure that was different from baseline seizures. States that he takes his seizure medications and doesn't have any other medical history for which he takes medications. Patient does not have any tongue lacerations.  - Admit to neuro  - continue depakote 500 PO Q12H.   - Lacosomide 100 PO Q12H.    - q4h neuro check  - Neurology consult placed; recc's apreciated    Chest pain  Assessment & Plan   Patient complains of sternal chest pain that radiates to left shoulder but does not radiate to jaw or further into chest. Pain is reproducible on physical exam with palpation of L 4th costochondral joint. ECG unremarkable, troponin elevated to 22, more consistent with vigorous exertion than corinna MI. Initial Ddx for this patient considering his age was Costochondritis vs MI vs GERD. Reproducibility of pain  with palpation, localization, nature of pain suggest costochondritis is most likely. Negative workup per above points away from ongoing cardiac injury.   - lidocaine cream for pain  - Tylenol 1000mg TID    Urinary frequency  Assessment & Plan  Per nursing, patient has had frequent urination with light colored urine output. Patient denies having history of diabetes, but possible it is manifesting now vs he may have ingested something before his seizure activity, although tox screen was negative.  -Hgb A1C 5.5.   - Urinalysis is negative.    Addendum:  In the meeting at the bedside with Mr. Crow and his daughter, joined by his son, Derrick, over the phone, the family was updated about his current state.  On approach, Mr. Crow was seated at the bedside, speaking on the phone.  He appears much more lucid, conversational, and able to follow complex commands as well as conversation about abstract topics.  Per daughter, he is back at baseline, with the exception of walking, but is currently impaired.  Of note, both Mr. Crow and his daughter they admit to their inability to fill the regimen of antiseizure medication as prescribed by his physician in [place], [place].  This is due to difficulty filling prescription on time.  Per the patient and his daughter, the pharmacy in which they fill the prescription does not stock his lacosamide, and require a few days to have it filled.  When Mr. Crow comes to fill his prescription a few days before he is completely out of his medication, he is turned away and instructed to return when completely out.  When he followed these instructions, he is told it will take a few days to fill his prescription, resulting in lapse of treatment course.  At this point, I suggested that the family look into and mail-in pharmacy service, who are well accustomed to such logistics.    Sign: Raymond Jose MD    Diet: Full diet  Pain: lidocaine cream PRN, Tylenol 1000 mg q8H as  needed  Bowel regimen: per protocol  PT/OT: ongoing  DVT prophylaxis: ACds, ambulation  Code status: full.

## 2020-07-03 NOTE — PROGRESS NOTES
Neurology Progress Note  Neurohospitalist Service, [facility]    Referring Physician: Benjamin Isabel M.D.    Chief Complaint   Patient presents with   • ALOC       HPI: Refer to initial documented Neurology H&P, as detailed in the patient's chart.    Interval History: No acute events overnight.  Patient complaints of moderate frontal sharp headache this AM.  No novel complaints.  No interval seizures.    Review of systems: In addition to what is detailed in the HPI and/or updated in the interval history, all other systems reviewed and are negative.    Past Medical History:    has a past medical history of Seizure disorder (HCC).    FHx:  family history is not on file.  Denies hx of stroke    SHx:   reports that he has never smoked. He has never used smokeless tobacco. He reports previous alcohol use.    Medications:    Current Facility-Administered Medications:   •  magnesium sulfate IVPB premix 2 g, 2 g, Intravenous, Once, Liam Golden M.D.  •  acetaminophen (TYLENOL) tablet 1,000 mg, 1,000 mg, Oral, TID, Iraj Roblero D.O., 1,000 mg at 07/03/20 1138  •  LORazepam (ATIVAN) injection 1-2 mg, 1-2 mg, Intravenous, Q4HRS PRN, Iraj Roblero D.O.  •  lidocaine-prilocaine (EMLA) 2.5-2.5 % cream, , Topical, PRN, Pratibha Jose M.D., 1 Application at 07/02/20 2232  •  valproate (DEPACON) 500 mg in D5W 50 mL IVPB, 500 mg, Intravenous, Q12HRS, Pratibha Jose M.D., Last Rate: 50 mL/hr at 07/03/20 1104, 500 mg at 07/03/20 1104  •  senna-docusate (PERICOLACE or SENOKOT S) 8.6-50 MG per tablet 2 Tab, 2 Tab, Oral, BID, Stopped at 07/02/20 1800 **AND** polyethylene glycol/lytes (MIRALAX) PACKET 1 Packet, 1 Packet, Oral, QDAY PRN **AND** magnesium hydroxide (MILK OF MAGNESIA) suspension 30 mL, 30 mL, Oral, QDAY PRN **AND** bisacodyl (DULCOLAX) suppository 10 mg, 10 mg, Rectal, QDAY PRN, Evert Zelaya D.O.  •  NS infusion, , Intravenous, Continuous, Evert Zelaya D.O., Last Rate: 83 mL/hr  at 07/03/20 0805  •  enoxaparin (LOVENOX) inj 40 mg, 40 mg, Subcutaneous, DAILY, Evert Zelaya D.O., 40 mg at 07/03/20 0502  •  lacosamide (VIMPAT) 100 mg in  mL ivpb, 100 mg, Intravenous, Q12HRS, Tee Haines M.D., Stopped at 07/03/20 0151    Physical Examination:     Vitals:    07/02/20 2000 07/02/20 2352 07/03/20 0400 07/03/20 0800   BP: 152/86 149/92 131/75 152/81   Pulse: 60 75 70 87   Resp: 17 17 17 16   Temp: 36.4 °C (97.5 °F) 36.4 °C (97.5 °F) 36.4 °C (97.6 °F) 36.2 °C (97.2 °F)   TempSrc: Temporal Temporal Temporal Temporal   SpO2: 94% 97% 96% 97%   Weight:       Height:           General: Patient is awake and in no acute distress  Eyes: examination of optic disks not indicated at this time  CV: RRR    NEUROLOGICAL EXAM:     Mental status: Awake, alert and oriented to place but not time eg. month, follows simple commands.  Less bradyphrenic today 7/3 compared to prior  Speech and language: speech is clear and fluent. The patient is able to name and repeat.  Cranial nerve exam: Pupils are equal, round and reactive to light bilaterally. Visual fields are full. Extraocular muscles are intact to facial tracking. Sensation in the face is intact to light touch. Face is symmetric. Hearing to finger rub equal. Palate elevates symmetrically. Shoulder shrug is full. Tongue is midline.  Motor exam: Strength is 5/5 in both arms both distally and proximally and at least antigravity in legs.  Tone is normal. No abnormal movements were seen on exam.  Sensory exam: No sensory deficits identified   Deep tendon reflexes:  2+ and symmetric. Toes down-going bilaterally.  Coordination: no ataxia   Gait: deferred per patient request    Objective Data:    Labs:  No results found for: PROTHROMBTM, INR   Lab Results   Component Value Date/Time    WBC 4.2 (L) 07/03/2020 03:49 AM    RBC 4.86 07/03/2020 03:49 AM    HEMOGLOBIN 14.1 07/03/2020 03:49 AM    HEMATOCRIT 43.4 07/03/2020 03:49 AM    MCV 89.3 07/03/2020  03:49 AM    MCH 29.0 07/03/2020 03:49 AM    MCHC 32.5 (L) 07/03/2020 03:49 AM    MPV 11.1 07/03/2020 03:49 AM    NEUTSPOLYS 60.00 07/02/2020 05:05 AM    LYMPHOCYTES 29.90 07/02/2020 05:05 AM    MONOCYTES 8.50 07/02/2020 05:05 AM    EOSINOPHILS 0.80 07/02/2020 05:05 AM    BASOPHILS 0.40 07/02/2020 05:05 AM      Lab Results   Component Value Date/Time    SODIUM 140 07/03/2020 03:49 AM    POTASSIUM 3.8 07/03/2020 03:49 AM    CHLORIDE 105 07/03/2020 03:49 AM    CO2 25 07/03/2020 03:49 AM    GLUCOSE 81 07/03/2020 03:49 AM    BUN 10 07/03/2020 03:49 AM    CREATININE 0.92 07/03/2020 03:49 AM      Lab Results   Component Value Date/Time    CHOLSTRLTOT 159 07/02/2020 05:04 AM    LDL 93 07/02/2020 05:04 AM    HDL 53 07/02/2020 05:04 AM    TRIGLYCERIDE 64 07/02/2020 05:04 AM       Lab Results   Component Value Date/Time    ALKPHOSPHAT 52 07/03/2020 03:49 AM    ASTSGOT 11 (L) 07/03/2020 03:49 AM    ALTSGPT 12 07/03/2020 03:49 AM    TBILIRUBIN 0.5 07/03/2020 03:49 AM        Imaging/Testing:    I interpreted and/or reviewed the patient's neuroimaging    MR-BRAIN-WITH & W/O   Final Result      1. Age-related cerebral atrophy.      2. No evidence of mass lesion, heterotopic gray matter, gross cortical dysplasia or mesial temporal sclerosis.      3. Minimal periventricular juxtacortical white matter changes consistent with chronic microvascular ischemic gliosis.      DX-CHEST-PORTABLE (1 VIEW)   Final Result      Right perihilar atelectasis favored over scarring      CT-HEAD W/O   Final Result      No acute intracranial abnormality is identified.      Mild atrophy.         DX-KNEE 2- LEFT    (Results Pending)   DX-HIP-COMPLETE - UNILATERAL 2+ LEFT    (Results Pending)   DX-SHOULDER 2+ LEFT    (Results Pending)     EEG as read by Dr. Haines 7/2/20: This is a normal limited wakefulness through N2 sleep EEG. No epileptiform discharges or subclinical seizures were seen.    Assessment and Plan:    60 year old man with hx of epilepsy,  poor historian, presenting with seizure likely secondary to medication noncompliance.  EEG negative for subclinical seizures.  MRI brain w/ and w/o CST reassuring with no evidence of focal mass or mesial temporal lobe sclerosis.  Patient is clinically improving, less bradyphrenic today 7/3/20 compared to prior.     1. Seizure - likely breakthrough  - Continue Vimpat 100mg BID   - Continue Depakote 500mg BID  - seizure precautions and report to DMV  - follow up in epilepsy clinic s/p discharge; referral placed    2. Hyperammonemia - possibly secondary to Depakote vs. Underlying medical etiology  - primary team to workup medical contributors and/or confounders  - if no clear etiology, recommend initiating Levocarnitine 330mg BID    3. Headache - tension vs. Migrainous  - 2g IV Magnesium x1 now  - Tylenol prn  - IVF  - behavioral modifications for migraine prevention:        -sleep 8hrs regularly at night       -eat 3 meals a day and do not skip meals       -drink 1-2 Liters of water a day       -light exercise       -headache diary        -trigger avoidance    Dispo: pending evaluation by PT to determine safety; anticipate d/c 7/4/20    No further recommendations or further studies from an acute neurological standpoint at this time. Please re-consult if you have further questions or there is a change in status.    The evaluation of the patient, and recommended management, was discussed with Dr. Benjamin Isabel via [employer]. I have performed a physical exam and reviewed and updated ROS and Plan today (7/3/2020). In review of yesterday's note (7/2/2020), there are no changes except as documented above.    Liam Golden MD  Medical Director, [facility], [facility]  Neurohospitalist, & Vascular Neurology, [facility]  Clinical  of Neurology, [employer]

## 2020-07-03 NOTE — NON-PROVIDER
Daily Progress Note:     Date of Service: 7/3/2020  Primary Team: [facility]   Attending: Dr. Benjamin Isabel  Senior Resident: Dr. Iraj Roblero  Intern: Dr. Pratibha Jose  Contact:  [phone]    ID:  Mr. Crow is a 61 yo male with a hx of seizures on Vimpat (lacosamide) and Keppra (levetiracetam) and unknown full PMHx who presented with altered mental status.     24-Hour Events:  Overnight, no acute events.    Subjective:   Patient reports feeling okay. He still has pain in his L shoulder, neck, L knee, and L hip. He also complains of a headache and says that it hurts to breath.     He has not had a bowel movement since admission.    Consultants/Specialty:  Neurology    Review of Systems:    Review of Systems   Constitutional: Negative for chills and fever.   Respiratory: Negative for shortness of breath.    Cardiovascular: Positive for chest pain.   Gastrointestinal: Negative for constipation and diarrhea.   Musculoskeletal: Positive for joint pain.   Neurological: Positive for dizziness and headaches.     Objective Data:   Physical Exam:   Vitals:   Temp:  [36.2 °C (97.2 °F)-36.4 °C (97.6 °F)] 36.4 °C (97.6 °F)  Pulse:  [60-75] 70  Resp:  [16-20] 17  BP: (126-152)/(75-92) 131/75  SpO2:  [94 %-100 %] 96 %    Physical Exam  Constitutional:       General: He is not in acute distress.     Appearance: Normal appearance.      Comments: Sitting up, a little confused but less than yesterday   HENT:      Head: Normocephalic and atraumatic.      Nose: Nose normal.      Mouth/Throat:      Mouth: Mucous membranes are moist.      Pharynx: No posterior oropharyngeal erythema.   Eyes:      Extraocular Movements: Extraocular movements intact.      Conjunctiva/sclera: Conjunctivae normal.   Cardiovascular:      Rate and Rhythm: Normal rate and regular rhythm.      Pulses: Normal pulses.      Heart sounds: No murmur.   Pulmonary:      Effort: Pulmonary effort is normal.      Breath sounds: Normal breath sounds. No  wheezing.   Chest:      Chest wall: Tenderness present.   Skin:     General: Skin is warm and dry.   Neurological:      Mental Status: He is alert.      Cranial Nerves: No cranial nerve deficit.      Sensory: No sensory deficit.      Motor: No weakness.      Deep Tendon Reflexes: Reflexes normal.      Comments: AO x 3 (person, place, situation)  Attention is improved from yesterday       Labs:   Ammonia high 68 (previously 76)  Neg for syphillis    Imaging:   Brain MRI - no mass lesions, age-related cerebral atrophy, minimal periventricular juxtacortical white matter changes consistent with chronic microvascular ischemic gliosis    EEG - no subclinical seizures    Assessment and Plan:    # Altered mental status  Hx consistent with seizure. Unable to characterize seizure type. Unknown precipitant. Patient had elevated ammonia levels that can be seen in valproic acid overdose, however, ammonia levels are not severely elevated and patient not demonstrating lethargy and signs of worsening with continued valproic acid use. Patient's liver and kidney function were normal. Prolonged post-ictal confusion concerning for status epilepticus especially with unknown duration of seizure episode. EEG negative for current subclinical seizures. MRI brain did not show mass lesion as potential cause of seizure.   - Neurochecks q4h  - Aspiration/seizure/fall precautions  - Continue Valproate 500 BID and Lacosamide 100mg BID  - Follow up on thiamine levels  - Neurology consulted.  - PT/OT     # Chest pain/L shoulder pain  CP with tenderness to palpation. EKG negative for MI. Troponin mildly elevated. Along with L shoulder tenderness and sacral tenderness, this chest pain is likely related to injury from patient's fall. Unlikely to be MI at this time. Patient still reports a lot of pain.   - Start Tylenol 1000mg q8h for pain   - Lidocaine cream for pain PRN  - X-ray of left shoulder, left hip, and left knee

## 2020-07-03 NOTE — CARE PLAN
Problem: Safety:  Goal: Will remain free from injury  7/2/2020 1835 by Aliyah Melendrez RSaeedNSaeed  Outcome: PROGRESSING AS EXPECTED  7/2/2020 1827 by Aliyah Melendrez RNALLELY  Outcome: PROGRESSING AS EXPECTED  Intervention: Provide assistance with mobility  Note: PT/OT worked with pt: EOB, standing. Free from fall, will cont to monitor  Intervention: Implement seizure precautions  Note: Worked with SLP d/t high risks of aspiration, pt remains NPO, will cont to monitor

## 2020-07-04 VITALS
TEMPERATURE: 97.6 F | OXYGEN SATURATION: 98 % | RESPIRATION RATE: 16 BRPM | BODY MASS INDEX: 29.38 KG/M2 | HEART RATE: 72 BPM | WEIGHT: 209.88 LBS | DIASTOLIC BLOOD PRESSURE: 88 MMHG | HEIGHT: 71 IN | SYSTOLIC BLOOD PRESSURE: 138 MMHG

## 2020-07-04 PROBLEM — R35.0 URINARY FREQUENCY: Status: RESOLVED | Noted: 2020-07-02 | Resolved: 2020-07-04

## 2020-07-04 PROBLEM — R07.9 CHEST PAIN: Status: RESOLVED | Noted: 2020-07-02 | Resolved: 2020-07-04

## 2020-07-04 PROBLEM — G93.40 ACUTE ENCEPHALOPATHY: Status: RESOLVED | Noted: 2020-07-02 | Resolved: 2020-07-04
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
## 2020-07-04 LAB
ANION GAP SERPL CALC-SCNC: 11 MMOL/L (ref 7–16)
BUN SERPL-MCNC: 13 MG/DL (ref 8–22)
CALCIUM SERPL-MCNC: 8.9 MG/DL (ref 8.5–10.5)
CHLORIDE SERPL-SCNC: 106 MMOL/L (ref 96–112)
CO2 SERPL-SCNC: 25 MMOL/L (ref 20–33)
CREAT SERPL-MCNC: 1.04 MG/DL (ref 0.5–1.4)
GLUCOSE SERPL-MCNC: 78 MG/DL (ref 65–99)
POTASSIUM SERPL-SCNC: 4 MMOL/L (ref 3.6–5.5)
SODIUM SERPL-SCNC: 142 MMOL/L (ref 135–145)

## 2020-07-04 PROCEDURE — 36415 COLL VENOUS BLD VENIPUNCTURE: CPT

## 2020-07-04 PROCEDURE — 700102 HCHG RX REV CODE 250 W/ 637 OVERRIDE(OP): Performed by: STUDENT IN AN ORGANIZED HEALTH CARE EDUCATION/TRAINING PROGRAM

## 2020-07-04 PROCEDURE — 700105 HCHG RX REV CODE 258: Performed by: STUDENT IN AN ORGANIZED HEALTH CARE EDUCATION/TRAINING PROGRAM

## 2020-07-04 PROCEDURE — A9270 NON-COVERED ITEM OR SERVICE: HCPCS | Performed by: STUDENT IN AN ORGANIZED HEALTH CARE EDUCATION/TRAINING PROGRAM

## 2020-07-04 PROCEDURE — 700111 HCHG RX REV CODE 636 W/ 250 OVERRIDE (IP): Performed by: STUDENT IN AN ORGANIZED HEALTH CARE EDUCATION/TRAINING PROGRAM

## 2020-07-04 PROCEDURE — 80048 BASIC METABOLIC PNL TOTAL CA: CPT

## 2020-07-04 PROCEDURE — 99239 HOSP IP/OBS DSCHRG MGMT >30: CPT | Mod: GC | Performed by: INTERNAL MEDICINE

## 2020-07-04 RX ORDER — LACOSAMIDE 100 MG/1
100 TABLET ORAL 2 TIMES DAILY
Qty: 60 TAB | Refills: 0 | Status: SHIPPED | OUTPATIENT
Start: 2020-07-04 | End: 2020-08-03

## 2020-07-04 RX ORDER — DIVALPROEX SODIUM 500 MG/1
500 TABLET, DELAYED RELEASE ORAL EVERY 12 HOURS
Qty: 90 TAB | Refills: 1 | Status: SHIPPED | OUTPATIENT
Start: 2020-07-04

## 2020-07-04 RX ORDER — ACETAMINOPHEN 500 MG
1000 TABLET ORAL 3 TIMES DAILY
Qty: 30 TAB | Refills: 0 | COMMUNITY
Start: 2020-07-04

## 2020-07-04 RX ADMIN — ENOXAPARIN SODIUM 40 MG: 40 INJECTION, SOLUTION INTRAVENOUS; SUBCUTANEOUS at 05:30

## 2020-07-04 RX ADMIN — ACETAMINOPHEN 1000 MG: 500 TABLET ORAL at 12:30

## 2020-07-04 RX ADMIN — DIVALPROEX SODIUM 500 MG: 500 TABLET, DELAYED RELEASE ORAL at 05:30

## 2020-07-04 RX ADMIN — SODIUM CHLORIDE: 9 INJECTION, SOLUTION INTRAVENOUS at 03:25

## 2020-07-04 RX ADMIN — ACETAMINOPHEN 1000 MG: 500 TABLET ORAL at 05:31

## 2020-07-04 RX ADMIN — LIDOCAINE AND PRILOCAINE 1 APPLICATION: 25; 25 CREAM TOPICAL at 05:30

## 2020-07-04 RX ADMIN — DOCUSATE SODIUM 50 MG AND SENNOSIDES 8.6 MG 2 TABLET: 8.6; 5 TABLET, FILM COATED ORAL at 05:31

## 2020-07-04 RX ADMIN — LACOSAMIDE 100 MG: 100 TABLET, FILM COATED ORAL at 05:31

## 2020-07-04 ASSESSMENT — ENCOUNTER SYMPTOMS
SHORTNESS OF BREATH: 0
ABDOMINAL PAIN: 0
FOCAL WEAKNESS: 0
CHILLS: 0
TINGLING: 0
COUGH: 0
HEADACHES: 0
NAUSEA: 0
DIZZINESS: 0
VOMITING: 0
TREMORS: 0
DIZZINESS: 1
PALPITATIONS: 0
FEVER: 0

## 2020-07-04 ASSESSMENT — PATIENT HEALTH QUESTIONNAIRE - PHQ9
1. LITTLE INTEREST OR PLEASURE IN DOING THINGS: NOT AT ALL
2. FEELING DOWN, DEPRESSED, IRRITABLE, OR HOPELESS: NOT AT ALL
SUM OF ALL RESPONSES TO PHQ9 QUESTIONS 1 AND 2: 0

## 2020-07-04 NOTE — THERAPY
"Physical Therapy   Daily Treatment     Patient Name: Wallace Crow  Age:  60 y.o., Sex:  male  Medical Record #: [id number]  Today's Date: 7/3/2020     Precautions: Fall Risk    Assessment    Pt more alert and able to participate with PT today. Requires SPV/Prabhjot for safety for all mobility.     Plan    Continue current treatment plan.    Discharge recommendations:  Recommend post-acute placement for continued physical therapy services prior to discharge home.     Versus home health transitional care for continued physical therapy services and 24/7 CG assist.       Subjective    \"A walk would be nice.\"     Objective       07/03/20 1415   Cognition    Speech/ Communication Delayed Responses   Orientation Level Not Oriented to Time   Level of Consciousness Alert   Ability To Follow Commands 3 Step   Comments Cooperative   Passive ROM Lower Body   Passive ROM Lower Body WDL   Active ROM Lower Body    Active ROM Lower Body  WDL   Strength Lower Body   Gross Strength Generalized Weakness, Equal Bilaterally   Balance   Standing Balance (Static) Fair +   Standing Balance (Dynamic) Fair   Weight Shift Standing Fair   Skilled Intervention Verbal Cuing;Tactile Cuing;Sequencing   Comments with FWW   Gait Analysis   Gait Level Of Assist Supervised   Assistive Device Front Wheel Walker   Distance (Feet) 200   Deviation Bradykinetic   Skilled Intervention Verbal Cuing;Sequencing   Bed Mobility    Supine to Sit Supervised   Scooting Supervised   Skilled Intervention Verbal Cuing   Comments HOB elevated   Functional Mobility   Sit to Stand Supervised   Bed, Chair, Wheelchair Transfer Minimal Assist   Transfer Method Stand Step   Mobility gait in sargent   Skilled Intervention Verbal Cuing;Tactile Cuing;Sequencing   Short Term Goals    Short Term Goal # 1 Pt will be SPV for supine<>sit without bed feautures in 6 txs to improve functional mobility.   Goal Outcome # 1 Goal met   Short Term Goal # 2 Pt will be SPV for all transfers with FWW " in 6 txs to improve functional mobility.   Goal Outcome # 2 Goal not met   Short Term Goal # 3 Pt will be SPV for gait >150' with FWW in 6 txs to improve functional mobility.   Goal Outcome # 3 Goal met   Anticipated Discharge Equipment   DC Equipment Front-Wheel Walker

## 2020-07-04 NOTE — THERAPY
Speech Language Pathology  Daily Treatment     Patient Name: Walalce Crow  Age:  60 y.o., Sex:  male  Medical Record #: [id number]  Today's Date: 7/3/2020       Assessment  The patient was seen for reassessment of oropharyngeal swallow function. The patient was awake, alert and sitting EOB for evaluation. The patient presented with significant improvement in oropharyngeal swallow function with no further s/s of aspiration or oropharyngeal dysphagia on PO trials of regular/thin liquid meal items.       Plan    Recommendations: 1) initiate regular/thin liquid meal items with swallow stratgeies.     Continue current treatment plan.    Discharge recommendations:  Anticipate that the patient will have no further speech therapy needs after discharge from the hospital.       Objective       07/03/20 0805   Dysphagia    Other Treatments Patient was seen for reassessment of swallow function this date with PO trials of ice chips, thins, purees, and solids.    Diet / Liquid Recommendation Regular (7);Thin (0)   Nutritional Liquid Intake Rating Scale Non thickened beverages   Nutritional Food Intake Rating Scale Total oral diet with no restrictions   Nursing Communication Swallow Precaution Sign Posted at Head of Bed   Skilled Intervention Compensatory Strategies;Verbal Cueing   Recommended Route of Medication Administration   Medication Administration  Whole with Liquid Wash

## 2020-07-04 NOTE — FACE TO FACE
Face to Face Supporting Documentation - Home Health    The encounter with this patient was in whole or in part the primary reason for home health admission.    Date of encounter:   Patient:                    MRN:                       YOB: 2021  Wallace Crow  [id number]  1960     Home health to see patient for:  Home health aide, Physical Therapy evaluation and treatment and Occupational therapy evaluation and treatment    Skilled need for:  Recent Deterioration of Health Status Seizure disorder    Skilled nursing interventions to include:  Comment: N/A    Homebound status evidenced by:  Need the aid of supportive devices such as crutches, canes, wheelchairs or walkers. Leaving home requires a considerable and taxing effort. There is a normal inability to leave the home.    Community Physician to provide follow up care: No primary care provider on file.     Optional Interventions? No      I certify the face to face encounter for this home health care referral meets the CMS requirements and the encounter/clinical assessment with the patient was, in whole, or in part, for the medical condition(s) listed above, which is the primary reason for home health care. Based on my clinical findings: the service(s) are medically necessary, support the need for home health care, and the homebound criteria are met.  I certify that this patient has had a face to face encounter by myself.  Iraj Roblero D.O. - KATIEI: [phone]

## 2020-07-04 NOTE — DISCHARGE SUMMARY
Discharge Summary    Date of Admission: 7/1/2020  Date of Discharge: No discharge date for patient encounter.  Discharging Attending: Benjamin Isabel M.D.   Discharging Senior Resident: Dr. Roblero  Discharging Intern: Dr. Jose    CHIEF COMPLAINT ON ADMISSION  Chief Complaint   Patient presents with   • ALOC       Reason for Admission  Seizure    Admission Date  7/1/2020    CODE STATUS  Full Code    HPI & HOSPITAL COURSE  Mr. Crow is a 80-year old male with past medical history of seizure disorder who presented to the hospital after having a witnessed seizure. Patient had been on Lacosamide and Depakote but family was unsure of doses and hence patient was evaluated by neurology and he was started on Lacosamide 100mg twice daily and Depakote 500mg twice daily. Seizure was thought to be a break-through seizure with no clear inciting factor. Patient remained stable, though his encephalopathy persisted. He had a negative infectious and metabolic workup and was evaluated with EEG and MRI which were both unremarkable. He slowly continued to improve back to his baseline. He was evaluated by therapy service who recommended post-acute services. We discussed this with the daughter and was comfortable with patient's discharge with home health and the referral was placed. Therefore, he is discharged in fair and stable condition to home with organized home healthcare and close outpatient follow-up.    The patient met 2-midnight criteria for an inpatient stay at the time of discharge.    Discharge Date  7/4/2020    FOLLOW UP ITEMS POST DISCHARGE  - Follow up with home health  - Follow up with PCP  - Follow up with Neurology     DISCHARGE DIAGNOSES  Active Problems:    Witnessed seizure (HCC) POA: Yes    Acute encephalopathy POA: Unknown  Resolved Problems:    Chest pain POA: Unknown    Fall POA: Unknown      Overview: Witnessed by daughter. No obvious trauma to head. CT head inconsistent       with acute bleed. PT noticed  a gait abnormality during tx session       07/02/2020.      - XR L hip, L knee.    Urinary frequency POA: Yes      FOLLOW UP  No future appointments.  PCP    In 2 weeks        MEDICATIONS ON DISCHARGE     Medication List      START taking these medications      Instructions   acetaminophen 500 MG Tabs  Commonly known as:  TYLENOL   Take 2 Tabs by mouth 3 times a day.  Dose:  1,000 mg        CHANGE how you take these medications      Instructions   divalproex 500 MG Tbec  What changed:    · medication strength  · how much to take  · when to take this  Commonly known as:  DEPAKOTE   Take 1 Tab by mouth every 12 hours.  Dose:  500 mg     lacosamide 100 MG Tabs tablet  What changed:    · medication strength  · how much to take  · when to take this  Commonly known as:  VIMPAT   Take 1 Tab by mouth 2 Times a Day for 30 days.  Dose:  100 mg            Allergies  No Known Allergies    DIET  Orders Placed This Encounter   Procedures   • Diet Order Regular     Standing Status:   Standing     Number of Occurrences:   1     Order Specific Question:   Diet:     Answer:   Regular [1]       ACTIVITY  As tolerated and directed by skilled nursing.  Weight bearing as tolerated    CONSULTATIONS  Dr. Golden with Neurology Service consulted.  Treatment options were discussed and plan of care agreed upon.    PROCEDURES  EEG-7/2/20

## 2020-07-04 NOTE — CARE PLAN
Problem: Communication  Goal: The ability to communicate needs accurately and effectively will improve  Outcome: DISCHARGED-GOAL NOT MET     Problem: Safety  Goal: Will remain free from injury  Outcome: DISCHARGED-GOAL NOT MET  Goal: Will remain free from falls  Outcome: DISCHARGED-GOAL NOT MET     Problem: Infection  Goal: Will remain free from infection  Outcome: DISCHARGED-GOAL NOT MET     Problem: Venous Thromboembolism (VTW)/Deep Vein Thrombosis (DVT) Prevention:  Goal: Patient will participate in Venous Thrombosis (VTE)/Deep Vein Thrombosis (DVT)Prevention Measures  Outcome: DISCHARGED-GOAL NOT MET     Problem: Bowel/Gastric:  Goal: Normal bowel function is maintained or improved  Outcome: DISCHARGED-GOAL NOT MET  Goal: Will not experience complications related to bowel motility  Outcome: DISCHARGED-GOAL NOT MET     Problem: Knowledge Deficit  Goal: Knowledge of disease process/condition, treatment plan, diagnostic tests, and medications will improve  Outcome: DISCHARGED-GOAL NOT MET  Goal: Knowledge of the prescribed therapeutic regimen will improve  Outcome: DISCHARGED-GOAL NOT MET     Problem: Discharge Barriers/Planning  Goal: Patient's continuum of care needs will be met  Outcome: DISCHARGED-GOAL NOT MET     Problem: Fluid Volume:  Goal: Will maintain balanced intake and output  Outcome: DISCHARGED-GOAL NOT MET     Problem: Respiratory:  Goal: Respiratory status will improve  Outcome: DISCHARGED-GOAL NOT MET     Problem: Urinary Elimination:  Goal: Ability to reestablish a normal urinary elimination pattern will improve  Outcome: DISCHARGED-GOAL NOT MET     Problem: Pain Management  Goal: Pain level will decrease to patient's comfort goal  Outcome: DISCHARGED-GOAL NOT MET     Problem: Safety:  Goal: Will remain free from injury  Outcome: DISCHARGED-GOAL NOT MET  Goal: Encourage identification and reduction of causative stimuli  Outcome: DISCHARGED-GOAL NOT MET     Problem: Skin Integrity  Goal: Risk for  impaired skin integrity will decrease  Outcome: DISCHARGED-GOAL NOT MET

## 2020-07-04 NOTE — PROGRESS NOTES
Pt daughter at bedside requesting an update. MD has been multiple times throughout the day to update the daughter over the phone. At this time, pt family has still not been updated. Notified Charge RN.   Dr. Jose returned page and stated he will be at bedside shortly to speak to to pt family.

## 2020-07-04 NOTE — DISCHARGE INSTRUCTIONS
Discharge Instructions    Discharged to home by car with relative. Discharged via wheelchair, hospital escort: Yes.  Special equipment needed: Not Applicable    Be sure to schedule a follow-up appointment with your primary care doctor or any specialists as instructed.     Discharge Plan:   Diet Plan: Discussed  Activity Level: Discussed  Confirmed Follow up Appointment: Patient to Call and Schedule Appointment  Confirmed Symptoms Management: Discussed  Medication Reconciliation Updated: Yes    I understand that a diet low in cholesterol, fat, and sodium is recommended for good health. Unless I have been given specific instructions below for another diet, I accept this instruction as my diet prescription.   Other diet: Regular      Special Instructions: None    · Is patient discharged on Warfarin / Coumadin?   No     Depression / Suicide Risk    As you are discharged from this [facility] facility, it is important to learn how to keep safe from harming yourself.    Recognize the warning signs:  · Abrupt changes in personality, positive or negative- including increase in energy   · Giving away possessions  · Change in eating patterns- significant weight changes-  positive or negative  · Change in sleeping patterns- unable to sleep or sleeping all the time   · Unwillingness or inability to communicate  · Depression  · Unusual sadness, discouragement and loneliness  · Talk of wanting to die  · Neglect of personal appearance   · Rebelliousness- reckless behavior  · Withdrawal from people/activities they love  · Confusion- inability to concentrate     If you or a loved one observes any of these behaviors or has concerns about self-harm, here's what you can do:  · Talk about it- your feelings and reasons for harming yourself  · Remove any means that you might use to hurt yourself (examples: pills, rope, extension cords, firearm)  · Get professional help from the community (Mental Health, Substance Abuse, psychological  counseling)  · Do not be alone:Call your Safe Contact- someone whom you trust who will be there for you.  · Call your local CRISIS HOTLINE 651-5314 or 038-277-0489  · Call your local Children's Mobile Crisis Response Team [place] (844) 050-6721 or www.[employer]eper  · Call the toll free National Suicide Prevention Hotlines   · National Suicide Prevention Lifeline 216-687-MPFW (2080)  · [employer] 800-SUICIDE (720-7724)      Seizure, Adult  A seizure is a sudden burst of abnormal electrical activity in the brain. Seizures usually last from 30 seconds to 2 minutes. The abnormal activity temporarily interrupts normal brain function. A seizure can cause many different symptoms depending on where in the brain it starts.  What are the causes?  Common causes of this condition include:  · Fever or infection.  · Brain abnormality, injury, bleeding, or tumor.  · Low blood sugar.  · Metabolic disorders or other conditions that are passed from parent to child (are inherited).  · Reaction to a substance, such as a drug or a medicine, or suddenly stopping the use of a substance (withdrawal).  · Stroke.  · Developmental disorders such as autism or cerebral palsy.  In some cases, the cause of this condition may not be known. Some people who have a seizure never have another one. Seizures usually do not cause brain damage or permanent problems unless they are prolonged. A person who has repeated seizures over time without a clear cause has a condition called epilepsy.  What increases the risk?  You are more likely to develop this condition if you have:  · A family history of epilepsy.  · Had a tonic-clonic seizure in the past. This is a type of seizure that involves whole-body contraction of muscles and a loss of consciousness.  · Autism, cerebral palsy, or other brain disorders.  · A history of head trauma, lack of oxygen at birth, or strokes.  What are the signs or symptoms?  There are many different types  of seizures. The symptoms of a seizure vary depending on the type of seizure you have. Examples of symptoms during a seizure include:  · Uncontrollable shaking (convulsions).  · Stiffening of the body.  · Loss of consciousness.  · Head nodding.  · Staring.  · Not responding to sound or touch.  · Loss of bladder or bowel control.  Some people have symptoms right before a seizure happens (aura) and right after a seizure happens (postictal).  Symptoms before a seizure may include:  · Fear or anxiety.  · Nausea.  · Feeling like the room is spinning (vertigo).  · A feeling of having seen or heard something before (déjà vu).  · Odd tastes or smells.  · Changes in vision, such as seeing flashing lights or spots.  Symptoms after a seizure may include:  · Confusion.  · Sleepiness.  · Headache.  · Weakness on one side of the body.  How is this diagnosed?  This condition may be diagnosed based on:  · A description of your symptoms. Video of your seizures can be helpful.  · Your medical history.  · A physical exam.  You may also have tests, including:  · Blood tests.  · CT scan.  · MRI.  · Electroencephalogram (EEG). This test measures electrical activity in the brain. An EEG can predict whether seizures will return (recur).  · A spinal tap (also called a lumbar puncture). This is the removal and testing of fluid that surrounds the brain and spinal cord.  How is this treated?  Most seizures will stop on their own in under 5 minutes, and no treatment is needed. Seizures that last longer than 5 minutes will usually need treatment. Treatment can include:  · Medicines given through an IV.  · Avoiding known triggers, such as medicines that you take for another condition.  · Medicines to treat epilepsy (antiepileptics), if epilepsy caused your seizures.  · Surgery to stop seizures, if you have epilepsy that does not respond to medicines.  Follow these instructions at home:  Medicines  · Take over-the-counter and prescription  medicines only as told by your health care provider.  · Avoid any substances that may prevent your medicine from working properly, such as alcohol.  Activity  · Do not drive, swim, or do any other activities that would be dangerous if you had another seizure. Wait until your health care provider says it is safe to do them.  · If you live in the [place] check with your local DMV (department of Clink) to find out about local driving laws. Each state has specific rules about when you can legally return to driving.  · Get enough rest. Lack of sleep can make seizures more likely to occur.  Educating others  Teach friends and family what to do if you have a seizure. They should:  · Lay you on the ground to prevent a fall.  · Cushion your head and body.  · Loosen any tight clothing around your neck.  · Turn you on your side. If vomiting occurs, this helps keep your airway clear.  · Not hold you down. Holding you down will not stop the seizure.  · Not put anything into your mouth.  · Know whether or not you need emergency care. For example, they should get help right away if you have a seizure that lasts longer than 5 minutes or have several seizures in a row.  · Stay with you until you recover.    General instructions  · Contact your health care provider each time you have a seizure.  · Avoid anything that has ever triggered a seizure for you.  · Keep a seizure diary. Record what you remember about each seizure, especially anything that might have triggered the seizure.  · Keep all follow-up visits as told by your health care provider. This is important.  Contact a health care provider if:  · You have another seizure.  · You have seizures more often.  · Your seizure symptoms change.  · You continue to have seizures with treatment.  · You have symptoms of an infection or illness. This might increase your risk of having a seizure.  Get help right away if:  · You have a seizure that:  ? Lasts longer than 5  minutes.  ? Is different than previous seizures.  ? Leaves you unable to speak or use a part of your body.  ? Makes it harder to breathe.  · You have:  ? A seizure after a head injury.  ? Multiple seizures in a row.  ? Confusion or a severe headache right after a seizure.  · You do not wake up immediately after a seizure.  · You injure yourself during a seizure.  These symptoms may represent a serious problem that is an emergency. Do not wait to see if the symptoms will go away. Get medical help right away. Call your local emergency services (911 in the [place] Do not drive yourself to the hospital.  Summary  · Seizures are caused by abnormal electrical activity in the brain. The activity disrupts normal brain function and can cause various symptoms, such as convulsions, abnormal movements, or a change in consciousness.  · There are many causes of seizures, including illnesses, medicines, genetic conditions, head injuries, strokes, tumors, substance abuse, or substance withdrawal.  · Most seizures will stop on their own in under 5 minutes. Seizures that last longer than 5 minutes are a medical emergency and require immediate treatment.  · Many medicines are used to treat seizures. Take over-the-counter and prescription medicines only as told by your health care provider.  This information is not intended to replace advice given to you by your health care provider. Make sure you discuss any questions you have with your health care provider.  Document Released: 12/15/2001 Document Revised: 03/06/2020 Document Reviewed: 03/06/2020  Elsevier Patient Education © 2020 [employer]

## 2020-07-04 NOTE — PROGRESS NOTES
Pt being discharged home. Spoke w/ case management, unable to set up home health d/t fact pt does not have PCP established right now. Spoke w/ [employer] and per MD pt can be discharge home w/o home health set up and get referred once set up w/ PCP in [place]. Pt's daughter and pt agreeable w/ this plan of care. Pt verbalized understanding of discharge planning. All medications gone over with pt and pt's family. Pt sent home w/ scripts. Walker sent w/ pt. All belongings sent home including cell phone. Pt left w/ family via wheelchair. Safety maintained.

## 2020-07-04 NOTE — PROGRESS NOTES
Daily Progress Note:     Date of Service: 7/4/2020  Primary Team: [facility]   Attending: Benjamin Isabel M.D.   Senior Resident: Dr. Roblero  Intern: Dr. Jose  Contact:  [phone]    Chief Complaint:   Seizure    Subjective   Patient reports that he is feeling significantly better. He feels like he has a more stable gait.   Spoke to daughter on the phone and she reports that she saw the patient at bedside yesterday and that he is at his baseline. She thinks that he may need a walker, but is okay with Mr. Crow being discharged with home health.     Consultants/Specialty:  Neurology, signed off     Review of Systems:     Review of Systems   Constitutional: Negative for chills and fever.   Respiratory: Negative for shortness of breath.    Cardiovascular: Negative for chest pain.   Gastrointestinal: Negative for abdominal pain, nausea and vomiting.   Neurological: Negative for dizziness, tingling, tremors, focal weakness and headaches.       Objective Data:   Physical Exam:   Vitals:   Temp:  [36.2 °C (97.2 °F)-36.6 °C (97.9 °F)] 36.6 °C (97.9 °F)  Pulse:  [67-87] 67  Resp:  [16-18] 17  BP: (134-152)/(81-94) 134/87  SpO2:  [95 %-98 %] 95 %     Physical Exam  Constitutional:       General: He is not in acute distress.     Appearance: He is not toxic-appearing.      Comments: Sitting upright in bed   HENT:      Head: Normocephalic and atraumatic.      Mouth/Throat:      Mouth: Mucous membranes are moist.      Pharynx: Oropharynx is clear.   Eyes:      General: No scleral icterus.     Extraocular Movements: Extraocular movements intact.      Conjunctiva/sclera: Conjunctivae normal.   Cardiovascular:      Rate and Rhythm: Normal rate.   Pulmonary:      Effort: Pulmonary effort is normal. No respiratory distress.   Abdominal:      General: Abdomen is flat. There is no distension.      Palpations: Abdomen is soft.   Musculoskeletal:         General: No swelling.   Skin:     General: Skin is warm and dry.    Neurological:      Mental Status: He is oriented to person, place, and time. Mental status is at baseline.      Cranial Nerves: No cranial nerve deficit.      Sensory: No sensory deficit.   Psychiatric:         Behavior: Behavior normal.       Labs:   Unremarkable     Imaging:   X-rays unremarkable    Acute encephalopathy  Assessment & Plan  Patient presented to the ED after seizure activity and was continuing to have a prolonged post-ictal state. Neurology was consulted and obtained EEG and MRI which were both unremarkable. Infectious and metabolic causes were ruled out as well. Patient continued to slowly improve. PT/OT evaluated patient and recommended post-acute services. Discussed with daughter and they were okay with pt being discharged with home health.   - Resolved   - Home health, discharge with walker    Witnessed seizure (HCC)- (present on admission)  Assessment & Plan  Patient with history of seizures presented after a witnessed seizure. Patient's Depakote level on admission was therapeutic and it is unclear why patient had a breakthrough seizure.   - Continue Depakote   - Continue Lacosomide   - Follow up with home neurologist

## 2020-07-04 NOTE — CARE PLAN
Problem: Communication  Goal: The ability to communicate needs accurately and effectively will improve  Outcome: PROGRESSING AS EXPECTED     Problem: Safety  Goal: Will remain free from injury  Outcome: PROGRESSING AS EXPECTED  Note: Seizure precautions in place.   Goal: Will remain free from falls  Outcome: PROGRESSING AS EXPECTED  Note: Fall precautions and bed alarm in place.      Problem: Safety:  Goal: Will remain free from injury  Outcome: PROGRESSING AS EXPECTED  Note: Seizure precautions in place.      Problem: Safety - Medical Restraint  Goal: Remains free of injury from restraints (Restraint for Interference with Medical Device)  Description: INTERVENTIONS:  1. Determine that other, less restrictive measures have been tried or would not be effective before applying the restraint  2. Evaluate the patient's condition at the time of restraint application  3. Inform patient/family regarding the reason for restraint  4. Q2H: Monitor safety, psychosocial status, comfort, nutrition and hydration  Outcome: MET  Goal: Free from restraint(s) (Restraint for Interference with Medical Device)  Description: INTERVENTIONS:  1. ONCE/SHIFT or MINIMUM Q12H: Assess and document the continuing need for restraints  2. Q24H: Continued use of restraint requires LIP to perform face to face examination and written order  3. Identify and implement measures to help patient regain control  Outcome: MET  Note: Pt. A&O 3-4, compliant with POC, and no longer restrained.

## 2020-07-04 NOTE — DISCHARGE PLANNING
Agency/Facility Name: [facility]  Spoke To: Lesia  Outcome: Contacted this [facility] inquiring about patient. Questions regarding diet, allergies, height, weight etc. answered.

## 2020-07-04 NOTE — NON-PROVIDER
Daily Progress Note:     Date of Service: 7/4/2020  Primary Team: [facility]   Attending: Dr. Benjamin Isabel  Senior Resident: Dr. Iraj Roblero  Intern: Dr. Pratibha Jose   Contact:  [phone]    ID:  Mr. Crow is a 59 yo male with a hx of seizures on Vimpat (lacosamide) and Keppra (levetiracetam) who presented with altered mental status.     24-Hour Events:  Neurology - recommended Levocarnitine 330mg BID if no clear etiology for hyperammonemia, Magnesium/Tylenol for headache    PT/OT/SLP - recommended post-acute placement ([facility]) vs. Home Health    Subjective:   Patent reports feeling better today. His pain has improved significantly, and he is not complaining of shoulder or chest pain anymore. He still has some dizziness, but it is not as bad. He has had a good appetite, and said it has helped with his headache. Patient has also been ambulating with nurse, and he notes that it is not great but he is improving.     Consultants/Specialty:  Neurology  PT/OT/SLP     Review of Systems:    Review of Systems   Constitutional: Negative for chills and fever.   Respiratory: Negative for cough and shortness of breath.    Cardiovascular: Negative for chest pain, palpitations and leg swelling.   Gastrointestinal: Negative for abdominal pain.   Neurological: Positive for dizziness. Negative for headaches.       Objective Data:   Physical Exam:   Vitals:   Temp:  [36.2 °C (97.2 °F)-36.6 °C (97.9 °F)] 36.6 °C (97.9 °F)  Pulse:  [67-87] 67  Resp:  [16-18] 17  BP: (134-152)/(81-94) 134/87  SpO2:  [95 %-98 %] 95 %    Physical Exam  Constitutional:       General: He is not in acute distress.     Appearance: Normal appearance.   HENT:      Head: Normocephalic and atraumatic.   Eyes:      Extraocular Movements: Extraocular movements intact.      Conjunctiva/sclera: Conjunctivae normal.   Cardiovascular:      Rate and Rhythm: Normal rate and regular rhythm.      Pulses: Normal pulses.      Heart sounds: No  murmur.   Pulmonary:      Effort: Pulmonary effort is normal.      Breath sounds: Normal breath sounds.   Chest:      Chest wall: No tenderness.   Skin:     General: Skin is warm and dry.   Neurological:      Mental Status: He is alert and oriented to person, place, and time.      Cranial Nerves: No cranial nerve deficit.      Sensory: No sensory deficit.      Motor: No weakness.      Comments: Improved attention, following all directions.    Psychiatric:         Behavior: Behavior normal.       Labs:   Unremarkable    Imaging:   Shoulder/hip/knee x-ray - normal    Assessment and Plan:  59 yo male with hx of seizures presented with altered mental status.      # Altered mental status  Hx consistent with seizure. Unable to characterize seizure type. Unknown precipitant. Patient had elevated ammonia levels that can be seen in valproic acid overdose, however, ammonia levels are not severely elevated and patient not demonstrating lethargy and signs of worsening with continued valproic acid use. Patient's liver and kidney function were normal. Prolonged post-ictal confusion concerning for status epilepticus especially with unknown duration of seizure episode. EEG negative for current subclinical seizures. MRI brain did not show mass lesion as potential cause of seizure. Patient has been demonstrating daily improvements in mental status.   - Discontinue neurochecks q4h  - Aspiration/seizure/fall precautions  - Continue Valproate 500 BID and Lacosamide 100mg BID  - Follow up on thiamine levels  - Discharge with home health.      # Chest pain/L shoulder pain  CP with tenderness to palpation on admission. EKG negative for MI. Troponin mildly elevated. Along with L shoulder tenderness and sacral tenderness, this chest pain is likely related to injury from patient's fall. Unlikely to be MI at this time. Patient reports significant improvement in pain with Tylenol.   - Continue Tylenol 1000mg q8h for pain   - Lidocaine cream for  pain PRN

## 2020-07-06 LAB — VIT B1 BLD-MCNC: 82 NMOL/L (ref 70–180)
